# Patient Record
Sex: FEMALE | Race: WHITE
[De-identification: names, ages, dates, MRNs, and addresses within clinical notes are randomized per-mention and may not be internally consistent; named-entity substitution may affect disease eponyms.]

---

## 2017-04-22 ENCOUNTER — TRANSCRIPTION ENCOUNTER (OUTPATIENT)
Age: 57
End: 2017-04-22

## 2017-05-18 ENCOUNTER — RESULT REVIEW (OUTPATIENT)
Age: 57
End: 2017-05-18

## 2017-06-14 ENCOUNTER — RESULT REVIEW (OUTPATIENT)
Age: 57
End: 2017-06-14

## 2017-11-03 ENCOUNTER — EMERGENCY (EMERGENCY)
Facility: HOSPITAL | Age: 57
LOS: 0 days | Discharge: HOME | End: 2017-11-03
Attending: EMERGENCY MEDICINE | Admitting: EMERGENCY MEDICINE
Payer: COMMERCIAL

## 2017-11-03 VITALS
DIASTOLIC BLOOD PRESSURE: 71 MMHG | OXYGEN SATURATION: 97 % | RESPIRATION RATE: 18 BRPM | HEART RATE: 77 BPM | HEIGHT: 68 IN | SYSTOLIC BLOOD PRESSURE: 140 MMHG | TEMPERATURE: 98 F

## 2017-11-03 LAB
ALBUMIN SERPL ELPH-MCNC: 3.5 G/DL — SIGNIFICANT CHANGE UP (ref 3.3–5)
ALP SERPL-CCNC: 78 U/L — SIGNIFICANT CHANGE UP (ref 40–120)
ALT FLD-CCNC: 27 U/L — SIGNIFICANT CHANGE UP (ref 12–78)
ANION GAP SERPL CALC-SCNC: 9 MMOL/L — SIGNIFICANT CHANGE UP (ref 5–17)
APTT BLD: 34.7 SEC — SIGNIFICANT CHANGE UP (ref 27.5–37.4)
AST SERPL-CCNC: 24 U/L — SIGNIFICANT CHANGE UP (ref 15–37)
BASOPHILS # BLD AUTO: 0.1 K/UL — SIGNIFICANT CHANGE UP (ref 0–0.2)
BASOPHILS NFR BLD AUTO: 0.5 % — SIGNIFICANT CHANGE UP (ref 0–2)
BILIRUB SERPL-MCNC: 0.5 MG/DL — SIGNIFICANT CHANGE UP (ref 0.2–1.2)
BUN SERPL-MCNC: 18 MG/DL — SIGNIFICANT CHANGE UP (ref 7–23)
CALCIUM SERPL-MCNC: 9.3 MG/DL — SIGNIFICANT CHANGE UP (ref 8.5–10.1)
CHLORIDE SERPL-SCNC: 105 MMOL/L — SIGNIFICANT CHANGE UP (ref 96–108)
CO2 SERPL-SCNC: 27 MMOL/L — SIGNIFICANT CHANGE UP (ref 22–31)
CREAT SERPL-MCNC: 0.86 MG/DL — SIGNIFICANT CHANGE UP (ref 0.5–1.3)
EOSINOPHIL # BLD AUTO: 0.1 K/UL — SIGNIFICANT CHANGE UP (ref 0–0.5)
EOSINOPHIL NFR BLD AUTO: 0.6 % — SIGNIFICANT CHANGE UP (ref 0–6)
GLUCOSE SERPL-MCNC: 95 MG/DL — SIGNIFICANT CHANGE UP (ref 70–99)
HCT VFR BLD CALC: 42 % — SIGNIFICANT CHANGE UP (ref 34.5–45)
HGB BLD-MCNC: 13.9 G/DL — SIGNIFICANT CHANGE UP (ref 11.5–15.5)
INR BLD: 1.07 RATIO — SIGNIFICANT CHANGE UP (ref 0.88–1.16)
LYMPHOCYTES # BLD AUTO: 1.5 K/UL — SIGNIFICANT CHANGE UP (ref 1–3.3)
LYMPHOCYTES # BLD AUTO: 13 % — SIGNIFICANT CHANGE UP (ref 13–44)
MCHC RBC-ENTMCNC: 31 PG — SIGNIFICANT CHANGE UP (ref 27–34)
MCHC RBC-ENTMCNC: 33.2 GM/DL — SIGNIFICANT CHANGE UP (ref 32–36)
MCV RBC AUTO: 93.4 FL — SIGNIFICANT CHANGE UP (ref 80–100)
MONOCYTES # BLD AUTO: 0.6 K/UL — SIGNIFICANT CHANGE UP (ref 0–0.9)
MONOCYTES NFR BLD AUTO: 4.7 % — SIGNIFICANT CHANGE UP (ref 2–14)
NEUTROPHILS # BLD AUTO: 9.7 K/UL — HIGH (ref 1.8–7.4)
NEUTROPHILS NFR BLD AUTO: 81.3 % — HIGH (ref 43–77)
PLATELET # BLD AUTO: 289 K/UL — SIGNIFICANT CHANGE UP (ref 150–400)
POTASSIUM SERPL-MCNC: 4 MMOL/L — SIGNIFICANT CHANGE UP (ref 3.5–5.3)
POTASSIUM SERPL-SCNC: 4 MMOL/L — SIGNIFICANT CHANGE UP (ref 3.5–5.3)
PROT SERPL-MCNC: 7.4 GM/DL — SIGNIFICANT CHANGE UP (ref 6–8.3)
PROTHROM AB SERPL-ACNC: 11.6 SEC — SIGNIFICANT CHANGE UP (ref 9.8–12.7)
RBC # BLD: 4.5 M/UL — SIGNIFICANT CHANGE UP (ref 3.8–5.2)
RBC # FLD: 12.2 % — SIGNIFICANT CHANGE UP (ref 10.3–14.5)
SODIUM SERPL-SCNC: 141 MMOL/L — SIGNIFICANT CHANGE UP (ref 135–145)
TROPONIN I SERPL-MCNC: <0.015 NG/ML — SIGNIFICANT CHANGE UP (ref 0.01–0.04)
WBC # BLD: 11.9 K/UL — HIGH (ref 3.8–10.5)
WBC # FLD AUTO: 11.9 K/UL — HIGH (ref 3.8–10.5)

## 2017-11-03 PROCEDURE — 71250 CT THORAX DX C-: CPT | Mod: 26

## 2017-11-03 PROCEDURE — 93010 ELECTROCARDIOGRAM REPORT: CPT

## 2017-11-03 PROCEDURE — 73590 X-RAY EXAM OF LOWER LEG: CPT | Mod: 26,RT

## 2017-11-03 PROCEDURE — 99285 EMERGENCY DEPT VISIT HI MDM: CPT

## 2017-11-03 RX ORDER — SODIUM CHLORIDE 9 MG/ML
1000 INJECTION INTRAMUSCULAR; INTRAVENOUS; SUBCUTANEOUS ONCE
Qty: 0 | Refills: 0 | Status: COMPLETED | OUTPATIENT
Start: 2017-11-03 | End: 2017-11-03

## 2017-11-03 RX ORDER — KETOROLAC TROMETHAMINE 30 MG/ML
30 SYRINGE (ML) INJECTION ONCE
Qty: 0 | Refills: 0 | Status: DISCONTINUED | OUTPATIENT
Start: 2017-11-03 | End: 2017-11-03

## 2017-11-03 RX ADMIN — SODIUM CHLORIDE 1000 MILLILITER(S): 9 INJECTION INTRAMUSCULAR; INTRAVENOUS; SUBCUTANEOUS at 17:10

## 2017-11-03 RX ADMIN — Medication 30 MILLIGRAM(S): at 19:28

## 2017-11-03 NOTE — ED ADULT NURSE REASSESSMENT NOTE - NS ED NURSE REASSESS COMMENT FT1
patient discharged home. alert and oriented x 4, respirations even and unlabored, ambulatory with steady gait. denies dizziness or nausea. iv taken out. written and verbal discharge and followup instructions given to patient, patient verbalized back understanding. patient calling friend for ride home. ambulatory to bathroom to change into clothing, all belongings returned to patient. copies of all lab and radiologic reports given to patient. patient discharged home from ED at 19:30, will wait in waiting room for ride home.
all results of Xrays discussed with pt pending dc home

## 2017-11-03 NOTE — ED PROVIDER NOTE - MUSCULOSKELETAL, MLM
Spine appears normal, range of motion is not limited. Leg compartments are soft. Spine appears normal, range of motion is not limited. Leg compartments are soft. Full ROM of both wrists, no tenderness, no swelling.
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
## 2017-11-03 NOTE — ED PROVIDER NOTE - PROGRESS NOTE DETAILS
Pt feeling well, workup negative. Pt given copy of results. Told to f/u for thyroid and adrenal gland findings.
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
## 2017-11-07 DIAGNOSIS — Z04.1 ENCOUNTER FOR EXAMINATION AND OBSERVATION FOLLOWING TRANSPORT ACCIDENT: ICD-10-CM

## 2019-05-15 NOTE — ED ADULT NURSE NOTE - GASTROINTESTINAL WDL
Abdomen soft, nontender, nondistended, bowel sounds present in all 4 quadrants.
no recurring infections/no persistent infections

## 2019-07-08 NOTE — ED ADULT TRIAGE NOTE - NS ED NURSE AMBULANCES
[facility] [No Acute Distress] : no acute distress [Well Developed] : well developed [Well-Appearing] : well-appearing [Well Nourished] : well nourished [Normal Sclera/Conjunctiva] : normal sclera/conjunctiva [PERRL] : pupils equal round and reactive to light [EOMI] : extraocular movements intact [Normal Outer Ear/Nose] : the outer ears and nose were normal in appearance [Normal Oropharynx] : the oropharynx was normal [No Lymphadenopathy] : no lymphadenopathy [No JVD] : no jugular venous distention [Thyroid Normal, No Nodules] : the thyroid was normal and there were no nodules present [Supple] : supple [No Accessory Muscle Use] : no accessory muscle use [Clear to Auscultation] : lungs were clear to auscultation bilaterally [No Respiratory Distress] : no respiratory distress  [Normal Rate] : normal rate  [Normal S1, S2] : normal S1 and S2 [Regular Rhythm] : with a regular rhythm [No Carotid Bruits] : no carotid bruits [No Abdominal Bruit] : a ~M bruit was not heard ~T in the abdomen [No Varicosities] : no varicosities [Pedal Pulses Present] : the pedal pulses are present [No Edema] : there was no peripheral edema [No Palpable Aorta] : no palpable aorta [Soft] : abdomen soft [No Extremity Clubbing/Cyanosis] : no extremity clubbing/cyanosis [Non-distended] : non-distended [Non Tender] : non-tender [No Masses] : no abdominal mass palpated [No HSM] : no HSM [Normal Anterior Cervical Nodes] : no anterior cervical lymphadenopathy [Normal Bowel Sounds] : normal bowel sounds [Normal Posterior Cervical Nodes] : no posterior cervical lymphadenopathy [No CVA Tenderness] : no CVA  tenderness [No Spinal Tenderness] : no spinal tenderness [No Joint Swelling] : no joint swelling [Grossly Normal Strength/Tone] : grossly normal strength/tone [No Rash] : no rash [Coordination Grossly Intact] : coordination grossly intact [No Focal Deficits] : no focal deficits [Normal Gait] : normal gait [Deep Tendon Reflexes (DTR)] : deep tendon reflexes were 2+ and symmetric [Normal Affect] : the affect was normal [Normal Insight/Judgement] : insight and judgment were intact [de-identified] : mild chronic venous changes [de-identified] : 2/6 syst murmor

## 2019-07-29 NOTE — ED ADULT NURSE NOTE - DISCHARGE DATE/TIME
Writer was notified that pt is calling requesting completion of the disability forms. Per the pt, the due date is today and she has not received two weeks worth of pay. Discussed situation further with the clinic manager. Explained that a letter was written, signed, and faxed to the pt. At this time, writer is uncertain if the provider completed the forms. Agreed to reach out to the provider. In the meantime, the clinic manager will have the pt refax these to the clinic.    03-Nov-2017 19:36

## 2019-10-20 ENCOUNTER — RESULT REVIEW (OUTPATIENT)
Age: 59
End: 2019-10-20

## 2020-12-07 PROBLEM — I49.9 CARDIAC ARRHYTHMIA, UNSPECIFIED: Chronic | Status: ACTIVE | Noted: 2017-11-03

## 2020-12-10 ENCOUNTER — APPOINTMENT (OUTPATIENT)
Dept: OTOLARYNGOLOGY | Facility: CLINIC | Age: 60
End: 2020-12-10
Payer: COMMERCIAL

## 2020-12-10 VITALS
WEIGHT: 288 LBS | HEIGHT: 67 IN | SYSTOLIC BLOOD PRESSURE: 133 MMHG | DIASTOLIC BLOOD PRESSURE: 86 MMHG | BODY MASS INDEX: 45.2 KG/M2 | HEART RATE: 71 BPM | TEMPERATURE: 97.8 F

## 2020-12-10 DIAGNOSIS — H72.91 UNSPECIFIED PERFORATION OF TYMPANIC MEMBRANE, RIGHT EAR: ICD-10-CM

## 2020-12-10 DIAGNOSIS — Z86.79 PERSONAL HISTORY OF OTHER DISEASES OF THE CIRCULATORY SYSTEM: ICD-10-CM

## 2020-12-10 DIAGNOSIS — Z86.39 PERSONAL HISTORY OF OTHER ENDOCRINE, NUTRITIONAL AND METABOLIC DISEASE: ICD-10-CM

## 2020-12-10 DIAGNOSIS — H61.22 IMPACTED CERUMEN, LEFT EAR: ICD-10-CM

## 2020-12-10 DIAGNOSIS — H90.3 SENSORINEURAL HEARING LOSS, BILATERAL: ICD-10-CM

## 2020-12-10 DIAGNOSIS — H61.303 ACQUIRED STENOSIS OF EXTERNAL EAR CANAL, UNSPECIFIED, BILATERAL: ICD-10-CM

## 2020-12-10 DIAGNOSIS — E27.8 OTHER SPECIFIED DISORDERS OF ADRENAL GLAND: ICD-10-CM

## 2020-12-10 DIAGNOSIS — Z87.19 PERSONAL HISTORY OF OTHER DISEASES OF THE DIGESTIVE SYSTEM: ICD-10-CM

## 2020-12-10 PROCEDURE — 92557 COMPREHENSIVE HEARING TEST: CPT

## 2020-12-10 PROCEDURE — 92567 TYMPANOMETRY: CPT

## 2020-12-10 PROCEDURE — G0268 REMOVAL OF IMPACTED WAX MD: CPT

## 2020-12-10 PROCEDURE — 99204 OFFICE O/P NEW MOD 45 MIN: CPT | Mod: 25

## 2020-12-10 PROCEDURE — 99072 ADDL SUPL MATRL&STAF TM PHE: CPT

## 2020-12-10 RX ORDER — IRBESARTAN 75 MG/1
75 TABLET ORAL
Refills: 0 | Status: ACTIVE | COMMUNITY

## 2020-12-10 RX ORDER — BUPROPION HYDROCHLORIDE 75 MG/1
75 TABLET, FILM COATED ORAL
Refills: 0 | Status: ACTIVE | COMMUNITY

## 2020-12-10 RX ORDER — ROSUVASTATIN CALCIUM 5 MG/1
TABLET, FILM COATED ORAL
Refills: 0 | Status: ACTIVE | COMMUNITY

## 2020-12-10 NOTE — ASSESSMENT
[FreeTextEntry1] : Patient here with problems hearing . Cerumen removed from left ear;  Subtotal right tm perforation.    Recommended strict dry ear precautions in right ear.  Discussed tympanoplasty for right ear ; also discussed hearing aide mohsen

## 2020-12-10 NOTE — REVIEW OF SYSTEMS
[Sneezing] : sneezing [Ear Pain] : ear pain [Ear Itch] : ear itch [Hearing Loss] : hearing loss [Lightheadedness] : lightheadedness [Ear Drainage] : ear drainage [Ear Noises] : ear noises [Nasal Congestion] : nasal congestion [Problem Snoring] : problem snoring [Sinus Pain] : sinus pain [Sinus Pressure] : sinus pressure [Snoring With Pauses] : snoring with pauses [Palpitations] : palpitations [Anxiety] : anxiety [Depression] : depression [Easy Bruising] : a tendency for easy bruising [Negative] : Endocrine [FreeTextEntry1] : reflux  joint aches  muscle aches   sweating at night

## 2020-12-10 NOTE — PHYSICAL EXAM
[Midline] : trachea located in midline position [Normal] : no rashes [FreeTextEntry1] : overweight [de-identified] : right eac normal; left impacted cerumen - narrow ear canals [de-identified] : right subtotal perforation ;

## 2021-02-24 ENCOUNTER — TRANSCRIPTION ENCOUNTER (OUTPATIENT)
Age: 61
End: 2021-02-24

## 2021-02-24 ENCOUNTER — APPOINTMENT (OUTPATIENT)
Dept: ORTHOPEDIC SURGERY | Facility: CLINIC | Age: 61
End: 2021-02-24
Payer: COMMERCIAL

## 2021-02-24 VITALS
WEIGHT: 290 LBS | HEIGHT: 68 IN | TEMPERATURE: 97.9 F | DIASTOLIC BLOOD PRESSURE: 77 MMHG | BODY MASS INDEX: 43.95 KG/M2 | HEART RATE: 88 BPM | SYSTOLIC BLOOD PRESSURE: 113 MMHG

## 2021-02-24 DIAGNOSIS — Z87.39 PERSONAL HISTORY OF OTHER DISEASES OF THE MUSCULOSKELETAL SYSTEM AND CONNECTIVE TISSUE: ICD-10-CM

## 2021-02-24 DIAGNOSIS — Z78.9 OTHER SPECIFIED HEALTH STATUS: ICD-10-CM

## 2021-02-24 PROCEDURE — 99203 OFFICE O/P NEW LOW 30 MIN: CPT

## 2021-02-24 PROCEDURE — 73560 X-RAY EXAM OF KNEE 1 OR 2: CPT | Mod: LT,RT

## 2021-02-24 PROCEDURE — 99072 ADDL SUPL MATRL&STAF TM PHE: CPT

## 2021-03-02 NOTE — REVIEW OF SYSTEMS
[Joint Pain] : joint pain [Joint Stiffness] : joint stiffness [Joint Swelling] : joint swelling [Feeling Tired] : fatigue [Lower Ext Edema] : lower extremity edema [SOB on Exertion] : shortness of breath on exertion [Abdominal Pain] : abdominal pain [Anxiety] : anxiety [Depression] : depression [Muscle Weakness] : muscle weakness [Hot Flashes] : hot flashes [Easy Bruising] : a tendency for easy bruising [Negative] : Neurological

## 2021-03-03 NOTE — PHYSICAL EXAM
[de-identified] : Right knee:\par Knee: Range of Motion in Degrees	\par 	                  Claimant:	Normal:	\par Flexion Active	  135 	                135-degrees	\par Flexion Passive	  135	                135-degrees	\par Extension Active	  0-5	                0-5-degrees	\par Extension Passive	  0-5	                0-5-degrees	\par \par No weakness to flexion/extension. No evidence of instability in the AP plane or varus or valgus stress.  Negative  Lachman.  Negative pivot shift.  Negative anterior drawer test.  Negative posterior drawer test.  Negative Desirae.  Negative Apley grind.  No medial or lateral joint line tenderness.  Positive tenderness over the medial and lateral facet of the patella.  Positive patellofemoral crepitations.  No lateral tilting patella.  No patella apprehension.  Positive crepitation in the medial and lateral femoral condyle.  No proximal or distal swelling, edema or tenderness.  No gross motor or sensory deficits. Mild intra-articular swelling.  2+ DP and PT pulses. Mild varus deformity.  Skin is intact.  No rashes, scars or lesions.\par \par Left knee:\par Knee: Range of Motion in Degrees	\par 	                  Claimant:	Normal:	\par Flexion Active	  135 	                135-degrees	\par Flexion Passive	  135	                135-degrees	\par Extension Active	  0-5	                0-5-degrees	\par Extension Passive	  0-5	                0-5-degrees	\par \par No weakness to flexion/extension. No evidence of instability in the AP plane or varus or valgus stress.  Negative  Lachman.  Negative pivot shift.  Negative anterior drawer test.  Negative posterior drawer test.  Negative Desirae.  Negative Apley grind.  No medial or lateral joint line tenderness.  Positive tenderness over the medial and lateral facet of the patella.  Positive patellofemoral crepitations.  No lateral tilting patella.  No patella apprehension.  Positive crepitation in the medial and lateral femoral condyle.  No proximal or distal swelling, edema or tenderness.  No gross motor or sensory deficits. Mild intra-articular swelling.  2+ DP and PT pulses. Mild varus deformity.  Skin is intact.  No rashes, scars or lesions.  [de-identified] : Gait: Slightly antalgic to antalgic gait.  Station: Normal  [de-identified] : Appearance: Well-developed, well-nourished female  in no acute distress.  [de-identified] : Radiographs, one to two views of the right knee, one to two views of the left knee and one view of bilateral knees, show moderate degenerative change.

## 2021-03-03 NOTE — DISCUSSION/SUMMARY
[de-identified] : The patient presents with osteoarthritis, bilateral knees.  At this time I recommend she undergo a course of physical therapy and anti-inflammatory.  She will be reassessed in four to six weeks.

## 2021-03-03 NOTE — CONSULT LETTER
[Dear  ___] : Dear  [unfilled], [Consult Letter:] : I had the pleasure of evaluating your patient, [unfilled]. [Please see my note below.] : Please see my note below. [Consult Closing:] : Thank you very much for allowing me to participate in the care of this patient.  If you have any questions, please do not hesitate to contact me. [Sincerely,] : Sincerely, [FreeTextEntry3] : Kaushik Ivy III, MD \par VICK/mago

## 2021-03-03 NOTE — HISTORY OF PRESENT ILLNESS
[9] : a current pain level of 9/10 [de-identified] : Kellie comes in today with complaints to bilateral knees.  She states it has been going on for the last several years, getting a little worse recently. The patient states the pain is intermittent.  The patient describes the pain as sharp.  The patient states rest make the symptoms better while walking and bending makes the symptoms worse.

## 2021-03-28 ENCOUNTER — APPOINTMENT (OUTPATIENT)
Dept: DISASTER EMERGENCY | Facility: OTHER | Age: 61
End: 2021-03-28
Payer: COMMERCIAL

## 2021-03-28 PROCEDURE — 0011A: CPT

## 2021-04-03 ENCOUNTER — TRANSCRIPTION ENCOUNTER (OUTPATIENT)
Age: 61
End: 2021-04-03

## 2021-04-25 ENCOUNTER — APPOINTMENT (OUTPATIENT)
Dept: DISASTER EMERGENCY | Facility: OTHER | Age: 61
End: 2021-04-25
Payer: COMMERCIAL

## 2021-04-25 PROCEDURE — 0012A: CPT

## 2021-07-08 ENCOUNTER — APPOINTMENT (OUTPATIENT)
Dept: ORTHOPEDIC SURGERY | Facility: CLINIC | Age: 61
End: 2021-07-08
Payer: COMMERCIAL

## 2021-07-08 VITALS
BODY MASS INDEX: 43.95 KG/M2 | HEART RATE: 80 BPM | SYSTOLIC BLOOD PRESSURE: 112 MMHG | HEIGHT: 68 IN | DIASTOLIC BLOOD PRESSURE: 77 MMHG | WEIGHT: 290 LBS

## 2021-07-08 PROCEDURE — 73560 X-RAY EXAM OF KNEE 1 OR 2: CPT | Mod: LT,RT

## 2021-07-08 PROCEDURE — 99214 OFFICE O/P EST MOD 30 MIN: CPT | Mod: 25

## 2021-07-08 PROCEDURE — 20610 DRAIN/INJ JOINT/BURSA W/O US: CPT | Mod: RT

## 2021-07-15 NOTE — ADDENDUM
[FreeTextEntry1] : This note was written by Jyotsna Gilbert on 07/13/2021 acting as scribe for Kaushik Ivy III, MD

## 2021-07-15 NOTE — PROCEDURE
[de-identified] : Consent: \par At this time, I have recommended an injection to the right knee.  The risks and benefits of the procedure were discussed with the patient in detail.  Upon verbal consent of the patient, we proceeded with the injection as noted below.  \par \par Procedure:  \par After a sterile prep, the patient underwent an injection of 9 cc of 1% Lidocaine without epinephrine and 1 cc of Kenalog into the right knee.  The patient tolerated the procedure well.  There were no complications.

## 2021-07-15 NOTE — HISTORY OF PRESENT ILLNESS
[de-identified] : Kellie comes in today.  She is having increasing complaints of pain to bilateral knees, right worse than left.

## 2021-07-15 NOTE — PHYSICAL EXAM
[de-identified] : Right knee:\par Knee: Range of Motion in Degrees	\par 	                  Claimant:	Normal:	\par Flexion Active	  135 	                135-degrees	\par Flexion Passive	  135	                135-degrees	\par Extension Active	  0-5	                0-5-degrees	\par Extension Passive	  0-5	                0-5-degrees	\par \par No weakness to flexion/extension. No evidence of instability in the AP plane or varus or valgus stress.  Negative  Lachman.  Negative pivot shift.  Negative anterior drawer test.  Negative posterior drawer test.  Negative Desirae.  Negative Apley grind.  No medial or lateral joint line tenderness.  Positive tenderness over the medial and lateral facet of the patella.  Positive patellofemoral crepitations.  No lateral tilting patella.  No patella apprehension.  Positive crepitation in the medial and lateral femoral condyle.  No proximal or distal swelling, edema or tenderness.  No gross motor or sensory deficits. Mild intra-articular swelling.  2+ DP and PT pulses. Mild varus deformity.  Skin is intact.  No rashes, scars or lesions.\par \par Left knee:\par Knee: Range of Motion in Degrees	\par 	                  Claimant:	Normal:	\par Flexion Active	  135 	                135-degrees	\par Flexion Passive	  135	                135-degrees	\par Extension Active	  0-5	                0-5-degrees	\par Extension Passive	  0-5	                0-5-degrees	\par \par No weakness to flexion/extension. No evidence of instability in the AP plane or varus or valgus stress.  Negative  Lachman.  Negative pivot shift.  Negative anterior drawer test.  Negative posterior drawer test.  Negative Desirae.  Negative Apley grind.  No medial or lateral joint line tenderness.  Positive tenderness over the medial and lateral facet of the patella.  Positive patellofemoral crepitations.  No lateral tilting patella.  No patella apprehension.  Positive crepitation in the medial and lateral femoral condyle.  No proximal or distal swelling, edema or tenderness.  No gross motor or sensory deficits. Mild intra-articular swelling.  2+ DP and PT pulses. Mild varus deformity.  Skin is intact.  No rashes, scars or lesions.  [de-identified] : Gait: Slightly antalgic to antalgic gait.  Station: Normal  [de-identified] : Appearance: Well-developed, well-nourished female  in no acute distress.  [de-identified] : Radiographs, one to two views of the right knee, one to two views of the left knee and one view of bilateral knees, show severe arthritis.

## 2021-07-15 NOTE — DISCUSSION/SUMMARY
[de-identified] : The patient presents with osteoarthritis, bilateral knees.  At this time I recommend ice, elevation and anti-inflammatory.  She will be reassessed in three weeks.

## 2021-07-19 ENCOUNTER — RESULT REVIEW (OUTPATIENT)
Age: 61
End: 2021-07-19

## 2021-07-26 ENCOUNTER — APPOINTMENT (OUTPATIENT)
Dept: ORTHOPEDIC SURGERY | Facility: CLINIC | Age: 61
End: 2021-07-26
Payer: COMMERCIAL

## 2021-07-26 VITALS
SYSTOLIC BLOOD PRESSURE: 123 MMHG | DIASTOLIC BLOOD PRESSURE: 84 MMHG | BODY MASS INDEX: 42.89 KG/M2 | WEIGHT: 283 LBS | HEIGHT: 68 IN | HEART RATE: 78 BPM

## 2021-07-26 DIAGNOSIS — M17.12 UNILATERAL PRIMARY OSTEOARTHRITIS, LEFT KNEE: ICD-10-CM

## 2021-07-26 PROCEDURE — 20610 DRAIN/INJ JOINT/BURSA W/O US: CPT | Mod: LT

## 2021-07-28 NOTE — PROCEDURE
[de-identified] : Consent: \par At this time, I have recommended an injection to the left knee.  The risks and benefits of the procedure were discussed with the patient in detail.  Upon verbal consent of the patient, we proceeded with the injection as noted below.  \par \par Procedure:  \par After a sterile prep, the patient underwent an injection of 9 cc of 1% Lidocaine without epinephrine and 1 cc of Kenalog into the left knee.  The patient tolerated the procedure well.  There were no complications.

## 2021-07-28 NOTE — HISTORY OF PRESENT ILLNESS
[de-identified] : Kellie comes in today for her bilateral knees.  She states her right is feeling better, but she is still having complaints of pain to the left.

## 2021-07-28 NOTE — DISCUSSION/SUMMARY
[de-identified] : The patient presents with osteoarthritis, left knee.  At this time I recommend ice and elevation.  She will be reassessed in three or four weeks.

## 2021-07-28 NOTE — PHYSICAL EXAM
[de-identified] : Left knee:\par Knee: Range of Motion in Degrees	\par 	                  Claimant:	Normal:	\par Flexion Active	  135 	                135-degrees	\par Flexion Passive	  135	                135-degrees	\par Extension Active	  0-5	                0-5-degrees	\par Extension Passive	  0-5	                0-5-degrees	\par \par No weakness to flexion/extension. No evidence of instability in the AP plane or varus or valgus stress.  Negative  Lachman.  Negative pivot shift.  Negative anterior drawer test.  Negative posterior drawer test.  Negative Desirae.  Negative Apley grind.  No medial or lateral joint line tenderness.  Positive tenderness over the medial and lateral facet of the patella.  Positive patellofemoral crepitations.  No lateral tilting patella.  No patella apprehension.  Positive crepitation in the medial and lateral femoral condyle.  No proximal or distal swelling, edema or tenderness.  No gross motor or sensory deficits. Mild intra-articular swelling.  2+ DP and PT pulses. Mild varus deformity.  Skin is intact.  No rashes, scars or lesions.  [de-identified] : Gait: Slightly antalgic to antalgic gait.  Station: Normal  [de-identified] : Appearance: Well-developed, well-nourished female  in no acute distress.

## 2021-07-28 NOTE — ADDENDUM
[FreeTextEntry1] : This note was written by Jyotsna Gilbert on 07/27/2021 acting as scribe for Kaushik Ivy III, MD

## 2021-09-27 ENCOUNTER — APPOINTMENT (OUTPATIENT)
Dept: ORTHOPEDIC SURGERY | Facility: CLINIC | Age: 61
End: 2021-09-27
Payer: COMMERCIAL

## 2021-09-27 VITALS
SYSTOLIC BLOOD PRESSURE: 134 MMHG | HEIGHT: 68 IN | WEIGHT: 284 LBS | BODY MASS INDEX: 43.04 KG/M2 | DIASTOLIC BLOOD PRESSURE: 85 MMHG | HEART RATE: 91 BPM

## 2021-09-27 DIAGNOSIS — M17.11 UNILATERAL PRIMARY OSTEOARTHRITIS, RIGHT KNEE: ICD-10-CM

## 2021-09-27 PROCEDURE — 20610 DRAIN/INJ JOINT/BURSA W/O US: CPT | Mod: RT

## 2021-09-30 NOTE — ADDENDUM
[FreeTextEntry1] : This note was written by Rachael Saini on 09/30/2021 acting as scribe for Kaushik Ivy III, MD

## 2021-09-30 NOTE — HISTORY OF PRESENT ILLNESS
[de-identified] : The patient comes in today with increasing complaints of pain to her right knee.

## 2021-09-30 NOTE — DISCUSSION/SUMMARY
[de-identified] : At this time, due to osteoarthritis of the right knee, the patient was given a cortisone injection.  I recommend ice, elevation and reassessment in 4-6 weeks.

## 2021-09-30 NOTE — PHYSICAL EXAM
[de-identified] : Right knee:\par Knee: Range of Motion in Degrees	\par 	  Claimant:	Normal:	\par Flexion Active	 135 	 135-degrees	\par Flexion Passive	 135	 135-degrees	\par Extension Active	 0-5	 0-5-degrees	\par Extension Passive	 0-5	 0-5-degrees	\par \par No weakness to flexion/extension. No evidence of instability in the AP plane or varus or valgus stress. Negative Lachman. Negative pivot shift. Negative anterior drawer test. Negative posterior drawer test. Negative Desirae. Negative Apley grind. No medial or lateral joint line tenderness. Positive tenderness over the medial and lateral facet of the patella. Positive patellofemoral crepitations. No lateral tilting patella. No patella apprehension. Positive crepitation in the medial and lateral femoral condyle. No proximal or distal swelling, edema or tenderness. No gross motor or sensory deficits. Mild intra-articular swelling. 2+ DP and PT pulses. Mild varus deformity. Skin is intact. No rashes, scars or lesions.\par  [de-identified] : Gait and Station:  Ambulating with a slightly antalgic to antalgic gait.  Station:  Normal.  [de-identified] : Appearance:  Well-developed, well-nourished female in no acute distress.\par

## 2021-09-30 NOTE — PROCEDURE
[de-identified] : Consent: \par At this time, I have recommended an injection to the right knee.  The risks and benefits of the procedure were discussed with the patient in detail.  Upon verbal consent of the patient, we proceeded with the injection as noted below.  \par \par Procedure:  \par After a sterile prep, the patient underwent an injection of 9 cc of 1% Lidocaine without epinephrine and 1 cc of Kenalog into the right knee.  The patient tolerated the procedure well.  There were no complications.  \par \par

## 2021-11-15 ENCOUNTER — FORM ENCOUNTER (OUTPATIENT)
Age: 61
End: 2021-11-15

## 2021-11-15 ENCOUNTER — APPOINTMENT (OUTPATIENT)
Dept: ORTHOPEDIC SURGERY | Facility: CLINIC | Age: 61
End: 2021-11-15
Payer: COMMERCIAL

## 2021-11-15 VITALS
WEIGHT: 284 LBS | DIASTOLIC BLOOD PRESSURE: 80 MMHG | SYSTOLIC BLOOD PRESSURE: 117 MMHG | HEART RATE: 73 BPM | HEIGHT: 68 IN | BODY MASS INDEX: 43.04 KG/M2

## 2021-11-15 DIAGNOSIS — M17.0 BILATERAL PRIMARY OSTEOARTHRITIS OF KNEE: ICD-10-CM

## 2021-11-15 PROCEDURE — 99213 OFFICE O/P EST LOW 20 MIN: CPT

## 2021-11-15 RX ORDER — SODIUM HYALURONATE INTRA-ARTICULAR SOLN PREF SYR 25 MG/2.5ML 25/2.5 MG/ML
25 SOLUTION PREFILLED SYRINGE INTRAARTICULAR
Qty: 10 | Refills: 0 | Status: ACTIVE | COMMUNITY
Start: 2021-11-15 | End: 1900-01-01

## 2021-11-16 NOTE — HISTORY OF PRESENT ILLNESS
[de-identified] : The patient comes in today for her bilateral knees.  She had some relief from her cortisone injection.

## 2021-11-16 NOTE — ADDENDUM
[FreeTextEntry1] : This note was written by Jaclyn Moore on 11/16/2021 acting as a scribe for SIMA MONTEZ III, MD

## 2021-11-16 NOTE — PHYSICAL EXAM
[de-identified] : Right Knee:\par Range of Motion in Degrees	\par 	 Claimant:	Normal:	\par Flexion Active	 135 	 135-degrees	\par Flexion Passive	 135	 135-degrees	\par Extension Active	 0-5	 0-5-degrees	\par Extension Passive	 0-5	 0-5-degrees	\par \par No weakness to flexion/extension. No evidence of instability in the AP plane or varus or valgus stress. Negative Lachman. Negative pivot shift. Negative anterior drawer test. Negative posterior drawer test. Negative Desirae. Negative Apley grind. No medial or lateral joint line tenderness. Positive tenderness over the medial and lateral facet of the patella. Positive patellofemoral crepitations. No lateral tilting patella. No patella apprehension. Positive crepitation in the medial and lateral femoral condyle. No proximal or distal swelling, edema or tenderness. No gross motor or sensory deficits. Mild intra-articular swelling. 2+ DP and PT pulses. Mild varus deformity. Skin is intact. No rashes, scars or lesions.\par  [de-identified] : Ambulating with a slightly antalgic to antalgic gait.  Station:  Normal.  [de-identified] : Appearance:  Well-developed, well-nourished female in no acute distress.\par

## 2021-11-16 NOTE — DISCUSSION/SUMMARY
[de-identified] : At this time, due to osteoarthritis of the bilateral knees and since she has had injections and physical therapy and the pain has persisted, I recommend she undergo a course of viscosupplementation. Of note, we had a long discussion concerning weight loss should she ultimately require knee arthroplasties.

## 2021-11-18 ENCOUNTER — APPOINTMENT (OUTPATIENT)
Dept: ORTHOPEDIC SURGERY | Facility: CLINIC | Age: 61
End: 2021-11-18
Payer: COMMERCIAL

## 2021-11-18 VITALS
HEART RATE: 81 BPM | HEIGHT: 68 IN | DIASTOLIC BLOOD PRESSURE: 78 MMHG | WEIGHT: 284 LBS | BODY MASS INDEX: 43.04 KG/M2 | SYSTOLIC BLOOD PRESSURE: 119 MMHG

## 2021-11-18 PROCEDURE — 73030 X-RAY EXAM OF SHOULDER: CPT | Mod: RT

## 2021-11-18 PROCEDURE — 99213 OFFICE O/P EST LOW 20 MIN: CPT

## 2021-11-22 RX ORDER — MELOXICAM 15 MG/1
15 TABLET ORAL
Qty: 14 | Refills: 0 | Status: ACTIVE | COMMUNITY
Start: 2021-11-22 | End: 1900-01-01

## 2021-11-29 NOTE — PHYSICAL EXAM
[de-identified] : Right Shoulder: \par Range of Motion in Degrees:   	\par    	                        Claimant:          Normal:  	\par Abduction (Active)  	180  	180 degrees  	\par Abduction (Passive)  	180  	180 degrees  	\par Forward elevation (Active):  	180  	180 degrees  	\par Forward elevation (Passive):  180  	180 degrees  	\par External rotation (Active):  	45  	45 degrees  	\par External rotation (Passive):  	45  	45 degrees  	\par Internal rotation (Active):  	L-1  	L-1  	\par Internal rotation (Passive):  	L-1  	L-1  	\par \par Diffusely tender. No motor weakness to internal rotation, external rotation or abduction in the scapular plane.  Negative crank test.  Negative O’Bruce’s test.  Negative Speed’s test. Negative Yergason’s test.  Negative cross arm test. Negative Hawkin’s sign.  Negative Neer’s sign.  Negative apprehension. Negative sulcus sign.  No gross neurological or vascular deficits distally.  Skin is intact.  No rashes, scars or lesions. 2+ radial and ulnar pulses. \par \par Left Shoulder: \par Range of Motion in Degrees:   	\par    	                        Claimant:  	Normal:  	\par Abduction (Active)  	180  	180 degrees  	\par Abduction (Passive)  	180  	180 degrees  	\par Forward elevation (Active):  	180  	180 degrees  	\par Forward elevation (Passive):  180  	180 degrees  	\par External rotation (Active):  	45  	45 degrees  	\par External rotation (Passive):  	45  	45 degrees  	\par Internal rotation (Active):  	L-1  	L-1  	\par Internal rotation (Passive):  	L-1  	L-1  \par 	\par No motor weakness to internal rotation, external rotation or abduction in the scapular plane.  Negative crank test.  Negative O’Bruce’s test.  Negative Speed’s test. Negative Yergason’s test.  Negative cross arm test.  No tenderness to palpation at the AC joint. Negative Hawkin’s sign.  Negative Neer’s sign.  Negative apprehension. Negative sulcus sign.  No gross neurological or vascular deficits distally.  Skin is intact.  No rashes, scars or lesions. 2+ radial and ulnar pulses. No extra-articular swelling or tenderness.\par   [de-identified] : Ambulating with a normal gait.  Station:  Normal.  [de-identified] : Appearance:  Well-developed, well-nourished female in no acute distress.\par   [de-identified] : Radiographs, two views of the right shoulder, show mild degenerative changes.

## 2021-11-29 NOTE — ADDENDUM
[FreeTextEntry1] : This note was written by Jaclyn Moore on 11/29/2021 acting as a scribe for SIMA MONTEZ III, MD

## 2021-11-29 NOTE — DISCUSSION/SUMMARY
[de-identified] : At this time, due to contusion of the right shoulder, she is instructed on home therapeutic modalities.  She will be reassessed in four to six weeks.

## 2021-11-29 NOTE — HISTORY OF PRESENT ILLNESS
[de-identified] : The patient comes in today for her right shoulder stating that she had a fall on November 17, 2021.  She complains of pain in her right shoulder.

## 2021-12-10 DIAGNOSIS — S40.011A CONTUSION OF RIGHT SHOULDER, INITIAL ENCOUNTER: ICD-10-CM

## 2021-12-10 RX ORDER — MELOXICAM 7.5 MG/1
7.5 TABLET ORAL
Qty: 30 | Refills: 0 | Status: ACTIVE | COMMUNITY
Start: 2021-12-10 | End: 1900-01-01

## 2021-12-15 ENCOUNTER — TRANSCRIPTION ENCOUNTER (OUTPATIENT)
Age: 61
End: 2021-12-15

## 2021-12-23 ENCOUNTER — APPOINTMENT (OUTPATIENT)
Dept: ORTHOPEDIC SURGERY | Facility: CLINIC | Age: 61
End: 2021-12-23

## 2022-12-06 NOTE — ADDENDUM
[FreeTextEntry1] : This note was written by Jyotsna Gilbert on 03/02/2021 acting as scribe for Kaushik Ivy III, MD  Normal

## 2024-03-13 ENCOUNTER — EMERGENCY (EMERGENCY)
Facility: HOSPITAL | Age: 64
LOS: 0 days | Discharge: HOME | End: 2024-03-13
Attending: EMERGENCY MEDICINE
Payer: COMMERCIAL

## 2024-03-13 VITALS
OXYGEN SATURATION: 98 % | TEMPERATURE: 98 F | DIASTOLIC BLOOD PRESSURE: 74 MMHG | SYSTOLIC BLOOD PRESSURE: 142 MMHG | WEIGHT: 179.9 LBS | RESPIRATION RATE: 18 BRPM | HEART RATE: 74 BPM

## 2024-03-13 DIAGNOSIS — Z91.148 PATIENT'S OTHER NONCOMPLIANCE WITH MEDICATION REGIMEN FOR OTHER REASON: ICD-10-CM

## 2024-03-13 DIAGNOSIS — T43.291A POISONING BY OTHER ANTIDEPRESSANTS, ACCIDENTAL (UNINTENTIONAL), INITIAL ENCOUNTER: ICD-10-CM

## 2024-03-13 DIAGNOSIS — Z79.82 LONG TERM (CURRENT) USE OF ASPIRIN: ICD-10-CM

## 2024-03-13 DIAGNOSIS — Z88.0 ALLERGY STATUS TO PENICILLIN: ICD-10-CM

## 2024-03-13 DIAGNOSIS — R68.2 DRY MOUTH, UNSPECIFIED: ICD-10-CM

## 2024-03-13 DIAGNOSIS — T39.016A UNDERDOSING OF ASPIRIN, INITIAL ENCOUNTER: ICD-10-CM

## 2024-03-13 DIAGNOSIS — F32.A DEPRESSION, UNSPECIFIED: ICD-10-CM

## 2024-03-13 PROCEDURE — 99283 EMERGENCY DEPT VISIT LOW MDM: CPT

## 2024-03-13 PROCEDURE — 99282 EMERGENCY DEPT VISIT SF MDM: CPT

## 2024-03-13 NOTE — ED ADULT TRIAGE NOTE - CHIEF COMPLAINT QUOTE
pt BIBEMS c/o chewing Wellbutrin. pt reports she was taking her nighttime medication, accidentally chewing 100mg Wellbutrin. pt reports she started feeling tingling in her mouth, and called EMS. pt denies any other complaints. pmh HTN, HLD, depression, anxiety. allergies to penicillin and amoxicillin.

## 2024-03-13 NOTE — ED STATDOCS - OBJECTIVE STATEMENT
62 y/o female with PMHx of depression, Unga presents to the ED after she accidently chewed her 100mg Wellbutrin this evening and read online that it could be dangerous so she came to the ED. Pt remotes she had some dry mouth. Pt reports she chews her baby aspirin, got distracted and chewed her Wellbutrin too. Pill is not extended release.

## 2024-03-13 NOTE — ED STATDOCS - NSFOLLOWUPINSTRUCTIONS_ED_ALL_ED_FT
FOLLOW UP WITH YOUR DOCTOR AS NEEDED. CALL THE OFFICE TO MAKE AN APPOINTMENT. RETURN TO ER FOR ANY WORSENING SYMPTOMS OR NEW CONCERNS.

## 2024-03-13 NOTE — ED STATDOCS - PATIENT PORTAL LINK FT
You can access the FollowMyHealth Patient Portal offered by [facility] by registering at the following website: [email or web address]. By joining [employer]’s FollowMyHealth portal, you will also be able to view your health information using other applications (apps) compatible with our system.

## 2024-03-13 NOTE — ED STATDOCS - CLINICAL SUMMARY MEDICAL DECISION MAKING FREE TEXT BOX
signed Marina Zimmerman PA-C Pt seen initially in intake by Dr Barrera.   63F came to ER for concern related to accidentally chewing her wellbutrin 100mg pill at 7pm tonight. Pt notes she forgot to take her am dose today and tonight she had chewed her baby aspirin and then put the wellbutrin in her mouth and started chewing it as well before she realized her mistake. She read the warnings which mentioned seizures and got very worried and came to ER. pt has pill bottle with her, it is not extended release. pt asymptomatic at this time. will DC. return precautions given. Pt feeling well, pt and family agree with DC and plan of care.

## 2025-01-24 ENCOUNTER — APPOINTMENT (OUTPATIENT)
Age: 65
End: 2025-01-24
Payer: COMMERCIAL

## 2025-01-24 VITALS
WEIGHT: 293 LBS | HEART RATE: 74 BPM | BODY MASS INDEX: 44.41 KG/M2 | DIASTOLIC BLOOD PRESSURE: 88 MMHG | RESPIRATION RATE: 16 BRPM | HEIGHT: 68 IN | SYSTOLIC BLOOD PRESSURE: 151 MMHG | TEMPERATURE: 97.2 F | OXYGEN SATURATION: 97 %

## 2025-01-24 DIAGNOSIS — E66.01 MORBID (SEVERE) OBESITY DUE TO EXCESS CALORIES: ICD-10-CM

## 2025-01-24 DIAGNOSIS — M54.50 LOW BACK PAIN, UNSPECIFIED: ICD-10-CM

## 2025-01-24 DIAGNOSIS — Z80.42 FAMILY HISTORY OF MALIGNANT NEOPLASM OF PROSTATE: ICD-10-CM

## 2025-01-24 DIAGNOSIS — E78.5 HYPERLIPIDEMIA, UNSPECIFIED: ICD-10-CM

## 2025-01-24 DIAGNOSIS — G47.33 OBSTRUCTIVE SLEEP APNEA (ADULT) (PEDIATRIC): ICD-10-CM

## 2025-01-24 PROCEDURE — 99386 PREV VISIT NEW AGE 40-64: CPT

## 2025-01-24 RX ORDER — TRAMADOL HYDROCHLORIDE 50 MG/1
50 TABLET, COATED ORAL
Refills: 0 | Status: ACTIVE | COMMUNITY

## 2025-01-24 RX ORDER — TIRZEPATIDE 5 MG/.5ML
5 INJECTION, SOLUTION SUBCUTANEOUS
Qty: 3 | Refills: 1 | Status: ACTIVE | COMMUNITY
Start: 2025-01-24 | End: 1900-01-01

## 2025-01-24 RX ORDER — BUPROPION HYDROCHLORIDE 100 MG/1
100 TABLET, FILM COATED ORAL
Refills: 0 | Status: ACTIVE | COMMUNITY

## 2025-01-24 RX ORDER — LOSARTAN POTASSIUM 50 MG/1
50 TABLET, FILM COATED ORAL
Refills: 0 | Status: ACTIVE | COMMUNITY

## 2025-01-24 RX ORDER — FUROSEMIDE 20 MG/1
20 TABLET ORAL
Refills: 0 | Status: ACTIVE | COMMUNITY

## 2025-01-24 RX ORDER — OXYBUTYNIN CHLORIDE 5 MG/1
5 TABLET ORAL
Refills: 0 | Status: ACTIVE | COMMUNITY

## 2025-01-24 RX ORDER — POTASSIUM CHLORIDE 10 MEQ
10 CAPSULE, EXTENDED RELEASE ORAL
Refills: 0 | Status: ACTIVE | COMMUNITY

## 2025-01-24 RX ORDER — OMEPRAZOLE 20 MG/1
20 CAPSULE, DELAYED RELEASE ORAL
Refills: 0 | Status: ACTIVE | COMMUNITY

## 2025-01-24 RX ORDER — ROSUVASTATIN CALCIUM 20 MG/1
20 TABLET, FILM COATED ORAL
Refills: 0 | Status: ACTIVE | COMMUNITY

## 2025-03-03 RX ORDER — OMEPRAZOLE 20 MG/1
20 CAPSULE, DELAYED RELEASE ORAL DAILY
Qty: 30 | Refills: 3 | Status: ACTIVE | COMMUNITY
Start: 2025-03-03 | End: 1900-01-01

## 2025-03-03 RX ORDER — BUPROPION HYDROCHLORIDE 100 MG/1
100 TABLET, FILM COATED ORAL DAILY
Qty: 90 | Refills: 0 | Status: ACTIVE | COMMUNITY
Start: 2025-03-03 | End: 1900-01-01

## 2025-03-03 RX ORDER — POTASSIUM CHLORIDE 750 MG/1
10 TABLET, FILM COATED, EXTENDED RELEASE ORAL DAILY
Qty: 90 | Refills: 0 | Status: ACTIVE | COMMUNITY
Start: 2025-03-03 | End: 1900-01-01

## 2025-03-03 RX ORDER — TRAMADOL HYDROCHLORIDE 50 MG/1
50 TABLET, COATED ORAL
Qty: 30 | Refills: 0 | Status: ACTIVE | COMMUNITY
Start: 2025-03-03 | End: 1900-01-01

## 2025-03-03 RX ORDER — ROSUVASTATIN CALCIUM 20 MG/1
20 TABLET, FILM COATED ORAL
Qty: 90 | Refills: 2 | Status: ACTIVE | COMMUNITY
Start: 2025-03-03 | End: 1900-01-01

## 2025-03-04 ENCOUNTER — NON-APPOINTMENT (OUTPATIENT)
Age: 65
End: 2025-03-04

## 2025-03-06 ENCOUNTER — TRANSCRIPTION ENCOUNTER (OUTPATIENT)
Age: 65
End: 2025-03-06

## 2025-03-10 ENCOUNTER — TRANSCRIPTION ENCOUNTER (OUTPATIENT)
Age: 65
End: 2025-03-10

## 2025-03-10 RX ORDER — POTASSIUM CHLORIDE 750 MG/1
10 CAPSULE, EXTENDED RELEASE ORAL DAILY
Qty: 90 | Refills: 0 | Status: ACTIVE | COMMUNITY
Start: 2025-03-10 | End: 1900-01-01

## 2025-04-10 ENCOUNTER — TRANSCRIPTION ENCOUNTER (OUTPATIENT)
Age: 65
End: 2025-04-10

## 2025-04-29 ENCOUNTER — NON-APPOINTMENT (OUTPATIENT)
Age: 65
End: 2025-04-29

## 2025-04-29 RX ORDER — METHYLPREDNISOLONE ACETATE 80 MG/ML
0 INJECTION, SUSPENSION INTRA-ARTICULAR; INTRALESIONAL; INTRAMUSCULAR; SOFT TISSUE
Refills: 0 | DISCHARGE
Start: 2025-04-29 | End: 2025-05-04

## 2025-05-01 ENCOUNTER — INPATIENT (INPATIENT)
Facility: HOSPITAL | Age: 65
LOS: 4 days | Discharge: HOME | DRG: 551 | End: 2025-05-06
Attending: HOSPITALIST | Admitting: STUDENT IN AN ORGANIZED HEALTH CARE EDUCATION/TRAINING PROGRAM
Payer: COMMERCIAL

## 2025-05-01 VITALS — HEIGHT: 67 IN

## 2025-05-01 DIAGNOSIS — Z78.9 OTHER SPECIFIED HEALTH STATUS: Chronic | ICD-10-CM

## 2025-05-01 DIAGNOSIS — I49.9 CARDIAC ARRHYTHMIA, UNSPECIFIED: ICD-10-CM

## 2025-05-01 DIAGNOSIS — S32.009A UNSPECIFIED FRACTURE OF UNSPECIFIED LUMBAR VERTEBRA, INITIAL ENCOUNTER FOR CLOSED FRACTURE: ICD-10-CM

## 2025-05-01 LAB
ALBUMIN SERPL ELPH-MCNC: 3.3 G/DL — SIGNIFICANT CHANGE UP (ref 3.3–5)
ALP SERPL-CCNC: 72 U/L — SIGNIFICANT CHANGE UP (ref 40–120)
ALT FLD-CCNC: 25 U/L — SIGNIFICANT CHANGE UP (ref 12–78)
ANION GAP SERPL CALC-SCNC: 6 MMOL/L — SIGNIFICANT CHANGE UP (ref 5–17)
APTT BLD: 27.5 SEC — SIGNIFICANT CHANGE UP (ref 26.1–36.8)
AST SERPL-CCNC: 18 U/L — SIGNIFICANT CHANGE UP (ref 15–37)
BASOPHILS # BLD AUTO: 0.01 K/UL — SIGNIFICANT CHANGE UP (ref 0–0.2)
BASOPHILS NFR BLD AUTO: 0.1 % — SIGNIFICANT CHANGE UP (ref 0–2)
BILIRUB SERPL-MCNC: 0.6 MG/DL — SIGNIFICANT CHANGE UP (ref 0.2–1.2)
BUN SERPL-MCNC: 15 MG/DL — SIGNIFICANT CHANGE UP (ref 7–23)
CALCIUM SERPL-MCNC: 9.6 MG/DL — SIGNIFICANT CHANGE UP (ref 8.5–10.1)
CHLORIDE SERPL-SCNC: 108 MMOL/L — SIGNIFICANT CHANGE UP (ref 96–108)
CO2 SERPL-SCNC: 25 MMOL/L — SIGNIFICANT CHANGE UP (ref 22–31)
CREAT SERPL-MCNC: 0.79 MG/DL — SIGNIFICANT CHANGE UP (ref 0.5–1.3)
EGFR: 84 ML/MIN/1.73M2 — SIGNIFICANT CHANGE UP
EGFR: 84 ML/MIN/1.73M2 — SIGNIFICANT CHANGE UP
EOSINOPHIL # BLD AUTO: 0.01 K/UL — SIGNIFICANT CHANGE UP (ref 0–0.5)
EOSINOPHIL NFR BLD AUTO: 0.1 % — SIGNIFICANT CHANGE UP (ref 0–6)
GLUCOSE SERPL-MCNC: 113 MG/DL — HIGH (ref 70–99)
HCT VFR BLD CALC: 41.9 % — SIGNIFICANT CHANGE UP (ref 34.5–45)
HGB BLD-MCNC: 13.8 G/DL — SIGNIFICANT CHANGE UP (ref 11.5–15.5)
IMM GRANULOCYTES # BLD AUTO: 0.05 K/UL — SIGNIFICANT CHANGE UP (ref 0–0.07)
IMM GRANULOCYTES NFR BLD AUTO: 0.5 % — SIGNIFICANT CHANGE UP (ref 0–0.9)
INR BLD: 1.08 RATIO — SIGNIFICANT CHANGE UP (ref 0.85–1.16)
LIDOCAIN IGE QN: 16 U/L — SIGNIFICANT CHANGE UP (ref 13–75)
LYMPHOCYTES # BLD AUTO: 1.09 K/UL — SIGNIFICANT CHANGE UP (ref 1–3.3)
LYMPHOCYTES NFR BLD AUTO: 10.2 % — LOW (ref 13–44)
MCHC RBC-ENTMCNC: 30.2 PG — SIGNIFICANT CHANGE UP (ref 27–34)
MCHC RBC-ENTMCNC: 32.9 G/DL — SIGNIFICANT CHANGE UP (ref 32–36)
MCV RBC AUTO: 91.7 FL — SIGNIFICANT CHANGE UP (ref 80–100)
MONOCYTES # BLD AUTO: 0.57 K/UL — SIGNIFICANT CHANGE UP (ref 0–0.9)
MONOCYTES NFR BLD AUTO: 5.3 % — SIGNIFICANT CHANGE UP (ref 2–14)
NEUTROPHILS # BLD AUTO: 8.93 K/UL — HIGH (ref 1.8–7.4)
NEUTROPHILS NFR BLD AUTO: 83.8 % — HIGH (ref 43–77)
NRBC # BLD AUTO: 0 K/UL — SIGNIFICANT CHANGE UP (ref 0–0)
NRBC # FLD: 0 K/UL — SIGNIFICANT CHANGE UP (ref 0–0)
NRBC BLD AUTO-RTO: 0 /100 WBCS — SIGNIFICANT CHANGE UP (ref 0–0)
PLATELET # BLD AUTO: 287 K/UL — SIGNIFICANT CHANGE UP (ref 150–400)
PMV BLD: 9.7 FL — SIGNIFICANT CHANGE UP (ref 7–13)
POTASSIUM SERPL-MCNC: 3.5 MMOL/L — SIGNIFICANT CHANGE UP (ref 3.5–5.3)
POTASSIUM SERPL-SCNC: 3.5 MMOL/L — SIGNIFICANT CHANGE UP (ref 3.5–5.3)
PROT SERPL-MCNC: 7 GM/DL — SIGNIFICANT CHANGE UP (ref 6–8.3)
PROTHROM AB SERPL-ACNC: 12.4 SEC — SIGNIFICANT CHANGE UP (ref 9.9–13.4)
RBC # BLD: 4.57 M/UL — SIGNIFICANT CHANGE UP (ref 3.8–5.2)
RBC # FLD: 14.8 % — HIGH (ref 10.3–14.5)
SODIUM SERPL-SCNC: 139 MMOL/L — SIGNIFICANT CHANGE UP (ref 135–145)
WBC # BLD: 10.66 K/UL — HIGH (ref 3.8–10.5)
WBC # FLD AUTO: 10.66 K/UL — HIGH (ref 3.8–10.5)

## 2025-05-01 PROCEDURE — 85027 COMPLETE CBC AUTOMATED: CPT

## 2025-05-01 PROCEDURE — 80048 BASIC METABOLIC PNL TOTAL CA: CPT

## 2025-05-01 PROCEDURE — 72148 MRI LUMBAR SPINE W/O DYE: CPT | Mod: 26

## 2025-05-01 PROCEDURE — 86900 BLOOD TYPING SEROLOGIC ABO: CPT

## 2025-05-01 PROCEDURE — 86850 RBC ANTIBODY SCREEN: CPT

## 2025-05-01 PROCEDURE — 85025 COMPLETE CBC W/AUTO DIFF WBC: CPT

## 2025-05-01 PROCEDURE — 97530 THERAPEUTIC ACTIVITIES: CPT | Mod: GP

## 2025-05-01 PROCEDURE — 82306 VITAMIN D 25 HYDROXY: CPT

## 2025-05-01 PROCEDURE — 72131 CT LUMBAR SPINE W/O DYE: CPT | Mod: 26

## 2025-05-01 PROCEDURE — 97116 GAIT TRAINING THERAPY: CPT | Mod: GP

## 2025-05-01 PROCEDURE — 93010 ELECTROCARDIOGRAM REPORT: CPT

## 2025-05-01 PROCEDURE — 85610 PROTHROMBIN TIME: CPT

## 2025-05-01 PROCEDURE — 97535 SELF CARE MNGMENT TRAINING: CPT | Mod: GO

## 2025-05-01 PROCEDURE — 97162 PT EVAL MOD COMPLEX 30 MIN: CPT | Mod: GP

## 2025-05-01 PROCEDURE — 36415 COLL VENOUS BLD VENIPUNCTURE: CPT

## 2025-05-01 PROCEDURE — 74177 CT ABD & PELVIS W/CONTRAST: CPT | Mod: MC

## 2025-05-01 PROCEDURE — 71045 X-RAY EXAM CHEST 1 VIEW: CPT

## 2025-05-01 PROCEDURE — 84702 CHORIONIC GONADOTROPIN TEST: CPT

## 2025-05-01 PROCEDURE — 97166 OT EVAL MOD COMPLEX 45 MIN: CPT | Mod: GO

## 2025-05-01 PROCEDURE — 71045 X-RAY EXAM CHEST 1 VIEW: CPT | Mod: 26

## 2025-05-01 PROCEDURE — 72148 MRI LUMBAR SPINE W/O DYE: CPT | Mod: MC

## 2025-05-01 PROCEDURE — 80053 COMPREHEN METABOLIC PANEL: CPT

## 2025-05-01 PROCEDURE — 85730 THROMBOPLASTIN TIME PARTIAL: CPT

## 2025-05-01 PROCEDURE — 99285 EMERGENCY DEPT VISIT HI MDM: CPT

## 2025-05-01 PROCEDURE — 93005 ELECTROCARDIOGRAM TRACING: CPT

## 2025-05-01 PROCEDURE — 86901 BLOOD TYPING SEROLOGIC RH(D): CPT

## 2025-05-01 PROCEDURE — 74177 CT ABD & PELVIS W/CONTRAST: CPT | Mod: 26

## 2025-05-01 PROCEDURE — 83690 ASSAY OF LIPASE: CPT

## 2025-05-01 RX ORDER — OXYCODONE HYDROCHLORIDE 30 MG/1
10 TABLET ORAL EVERY 4 HOURS
Refills: 0 | Status: DISCONTINUED | OUTPATIENT
Start: 2025-05-01 | End: 2025-05-03

## 2025-05-01 RX ORDER — CALCIUM CARBONATE 750 MG/1
1 TABLET ORAL
Refills: 0 | DISCHARGE

## 2025-05-01 RX ORDER — ESCITALOPRAM OXALATE 20 MG/1
1 TABLET ORAL
Refills: 0 | DISCHARGE

## 2025-05-01 RX ORDER — METHYLPREDNISOLONE ACETATE 80 MG/ML
0 INJECTION, SUSPENSION INTRA-ARTICULAR; INTRALESIONAL; INTRAMUSCULAR; SOFT TISSUE
Refills: 0 | DISCHARGE

## 2025-05-01 RX ORDER — OMEPRAZOLE 20 MG/1
1 CAPSULE, DELAYED RELEASE ORAL
Refills: 0 | DISCHARGE

## 2025-05-01 RX ORDER — METHYLPREDNISOLONE ACETATE 80 MG/ML
4 INJECTION, SUSPENSION INTRA-ARTICULAR; INTRALESIONAL; INTRAMUSCULAR; SOFT TISSUE ONCE
Refills: 0 | Status: DISCONTINUED | OUTPATIENT
Start: 2025-05-01 | End: 2025-05-01

## 2025-05-01 RX ORDER — ASPIRIN 325 MG
1 TABLET ORAL
Refills: 0 | DISCHARGE

## 2025-05-01 RX ORDER — POLYETHYLENE GLYCOL 3350 17 G/17G
17 POWDER, FOR SOLUTION ORAL DAILY
Refills: 0 | Status: DISCONTINUED | OUTPATIENT
Start: 2025-05-01 | End: 2025-05-06

## 2025-05-01 RX ORDER — LIDOCAINE HYDROCHLORIDE 20 MG/ML
1 JELLY TOPICAL DAILY
Refills: 0 | Status: DISCONTINUED | OUTPATIENT
Start: 2025-05-01 | End: 2025-05-06

## 2025-05-01 RX ORDER — ONDANSETRON HCL/PF 4 MG/2 ML
4 VIAL (ML) INJECTION EVERY 8 HOURS
Refills: 0 | Status: DISCONTINUED | OUTPATIENT
Start: 2025-05-01 | End: 2025-05-06

## 2025-05-01 RX ORDER — ACETAMINOPHEN 500 MG/5ML
1000 LIQUID (ML) ORAL EVERY 8 HOURS
Refills: 0 | Status: DISCONTINUED | OUTPATIENT
Start: 2025-05-01 | End: 2025-05-06

## 2025-05-01 RX ORDER — B1/B2/B3/B5/B6/B12/VIT C/FOLIC 500-0.5 MG
1 TABLET ORAL
Refills: 0 | DISCHARGE

## 2025-05-01 RX ORDER — OXYBUTYNIN CHLORIDE 5 MG/1
1 TABLET, FILM COATED, EXTENDED RELEASE ORAL
Refills: 0 | DISCHARGE

## 2025-05-01 RX ORDER — BUPROPION HYDROBROMIDE 522 MG/1
1 TABLET, EXTENDED RELEASE ORAL
Refills: 0 | DISCHARGE

## 2025-05-01 RX ORDER — ROSUVASTATIN CALCIUM 20 MG/1
1 TABLET, FILM COATED ORAL
Refills: 0 | DISCHARGE

## 2025-05-01 RX ORDER — UBIDECARENONE 100 MG
1 CAPSULE ORAL
Refills: 0 | DISCHARGE

## 2025-05-01 RX ORDER — ROSUVASTATIN CALCIUM 20 MG/1
20 TABLET, FILM COATED ORAL AT BEDTIME
Refills: 0 | Status: DISCONTINUED | OUTPATIENT
Start: 2025-05-01 | End: 2025-05-06

## 2025-05-01 RX ORDER — BUPROPION HYDROBROMIDE 522 MG/1
100 TABLET, EXTENDED RELEASE ORAL DAILY
Refills: 0 | Status: DISCONTINUED | OUTPATIENT
Start: 2025-05-01 | End: 2025-05-06

## 2025-05-01 RX ORDER — LOSARTAN POTASSIUM 100 MG/1
1 TABLET, FILM COATED ORAL
Refills: 0 | DISCHARGE

## 2025-05-01 RX ORDER — ACETAMINOPHEN 500 MG/5ML
2 LIQUID (ML) ORAL
Refills: 0 | DISCHARGE

## 2025-05-01 RX ORDER — MELOXICAM 15 MG/1
1 TABLET ORAL
Refills: 0 | DISCHARGE

## 2025-05-01 RX ORDER — MAGNESIUM, ALUMINUM HYDROXIDE 200-200 MG
30 TABLET,CHEWABLE ORAL EVERY 4 HOURS
Refills: 0 | Status: DISCONTINUED | OUTPATIENT
Start: 2025-05-01 | End: 2025-05-06

## 2025-05-01 RX ORDER — BISACODYL 5 MG
5 TABLET, DELAYED RELEASE (ENTERIC COATED) ORAL DAILY
Refills: 0 | Status: DISCONTINUED | OUTPATIENT
Start: 2025-05-01 | End: 2025-05-06

## 2025-05-01 RX ORDER — NALOXONE HYDROCHLORIDE 0.4 MG/ML
0.4 INJECTION, SOLUTION INTRAMUSCULAR; INTRAVENOUS; SUBCUTANEOUS ONCE
Refills: 0 | Status: DISCONTINUED | OUTPATIENT
Start: 2025-05-01 | End: 2025-05-06

## 2025-05-01 RX ORDER — TRAMADOL HYDROCHLORIDE 50 MG/1
1 TABLET, FILM COATED ORAL
Refills: 0 | DISCHARGE

## 2025-05-01 RX ORDER — KETOROLAC TROMETHAMINE 30 MG/ML
30 INJECTION, SOLUTION INTRAMUSCULAR; INTRAVENOUS ONCE
Refills: 0 | Status: DISCONTINUED | OUTPATIENT
Start: 2025-05-01 | End: 2025-05-01

## 2025-05-01 RX ORDER — DEXAMETHASONE 0.5 MG/1
6 TABLET ORAL DAILY
Refills: 0 | Status: DISCONTINUED | OUTPATIENT
Start: 2025-05-01 | End: 2025-05-02

## 2025-05-01 RX ORDER — LIDOCAINE HYDROCHLORIDE 20 MG/ML
1 JELLY TOPICAL ONCE
Refills: 0 | Status: COMPLETED | OUTPATIENT
Start: 2025-05-01 | End: 2025-05-01

## 2025-05-01 RX ORDER — MAGNESIUM OXIDE 400 MG
1 TABLET ORAL
Refills: 0 | DISCHARGE

## 2025-05-01 RX ORDER — GABAPENTIN 400 MG/1
300 CAPSULE ORAL THREE TIMES A DAY
Refills: 0 | Status: DISCONTINUED | OUTPATIENT
Start: 2025-05-01 | End: 2025-05-02

## 2025-05-01 RX ORDER — ESCITALOPRAM OXALATE 20 MG/1
20 TABLET ORAL DAILY
Refills: 0 | Status: DISCONTINUED | OUTPATIENT
Start: 2025-05-01 | End: 2025-05-06

## 2025-05-01 RX ORDER — DOCUSATE SODIUM 100 MG
1 CAPSULE ORAL
Refills: 0 | DISCHARGE

## 2025-05-01 RX ORDER — MELATONIN 5 MG
3 TABLET ORAL AT BEDTIME
Refills: 0 | Status: DISCONTINUED | OUTPATIENT
Start: 2025-05-01 | End: 2025-05-06

## 2025-05-01 RX ORDER — SENNA 187 MG
2 TABLET ORAL AT BEDTIME
Refills: 0 | Status: DISCONTINUED | OUTPATIENT
Start: 2025-05-01 | End: 2025-05-06

## 2025-05-01 RX ORDER — LOSARTAN POTASSIUM 100 MG/1
50 TABLET, FILM COATED ORAL DAILY
Refills: 0 | Status: DISCONTINUED | OUTPATIENT
Start: 2025-05-02 | End: 2025-05-06

## 2025-05-01 RX ORDER — OXYCODONE HYDROCHLORIDE 30 MG/1
5 TABLET ORAL EVERY 4 HOURS
Refills: 0 | Status: DISCONTINUED | OUTPATIENT
Start: 2025-05-01 | End: 2025-05-06

## 2025-05-01 RX ORDER — OXYBUTYNIN CHLORIDE 5 MG/1
5 TABLET, FILM COATED, EXTENDED RELEASE ORAL DAILY
Refills: 0 | Status: DISCONTINUED | OUTPATIENT
Start: 2025-05-01 | End: 2025-05-06

## 2025-05-01 RX ORDER — BUPROPION HYDROBROMIDE 522 MG/1
100 TABLET, EXTENDED RELEASE ORAL DAILY
Refills: 0 | Status: DISCONTINUED | OUTPATIENT
Start: 2025-05-01 | End: 2025-05-01

## 2025-05-01 RX ORDER — HEPARIN SODIUM 1000 [USP'U]/ML
5000 INJECTION INTRAVENOUS; SUBCUTANEOUS EVERY 8 HOURS
Refills: 0 | Status: DISCONTINUED | OUTPATIENT
Start: 2025-05-01 | End: 2025-05-01

## 2025-05-01 RX ORDER — OXYCODONE HYDROCHLORIDE AND ACETAMINOPHEN 10; 325 MG/1; MG/1
1 TABLET ORAL ONCE
Refills: 0 | Status: DISCONTINUED | OUTPATIENT
Start: 2025-05-01 | End: 2025-05-01

## 2025-05-01 RX ADMIN — Medication 40 MILLIEQUIVALENT(S): at 21:42

## 2025-05-01 RX ADMIN — OXYCODONE HYDROCHLORIDE AND ACETAMINOPHEN 1 TABLET(S): 10; 325 TABLET ORAL at 09:55

## 2025-05-01 RX ADMIN — ROSUVASTATIN CALCIUM 20 MILLIGRAM(S): 20 TABLET, FILM COATED ORAL at 21:43

## 2025-05-01 RX ADMIN — DEXAMETHASONE 6 MILLIGRAM(S): 0.5 TABLET ORAL at 17:17

## 2025-05-01 RX ADMIN — GABAPENTIN 300 MILLIGRAM(S): 400 CAPSULE ORAL at 21:43

## 2025-05-01 RX ADMIN — Medication 1000 MILLIGRAM(S): at 21:42

## 2025-05-01 RX ADMIN — LIDOCAINE HYDROCHLORIDE 1 PATCH: 20 JELLY TOPICAL at 09:55

## 2025-05-01 RX ADMIN — Medication 4 MILLIGRAM(S): at 13:19
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
## 2025-05-01 NOTE — ED ADULT NURSE NOTE - NSFALLRISKINTERV_ED_ALL_ED
Assistance OOB with selected safe patient handling equipment if applicable/Assistance with ambulation/Communicate fall risk and risk factors to all staff, patient, and family/Monitor gait and stability/Provide visual cue: yellow wristband, yellow gown, etc/Reinforce activity limits and safety measures with patient and family/Call bell, personal items and telephone in reach/Instruct patient to call for assistance before getting out of bed/chair/stretcher/Non-slip footwear applied when patient is off stretcher/[place] to call system/Physically safe environment - no spills, clutter or unnecessary equipment/Purposeful Proactive Rounding/Room/bathroom lighting operational, light cord in reach

## 2025-05-01 NOTE — H&P ADULT - NSICDXFAMILYHX_GEN_ALL_CORE_FT
FAMILY HISTORY:  No pertinent family history in first degree relatives     FAMILY HISTORY:  Mother  Still living? Unknown  FH: diabetes mellitus, Age at diagnosis: Age Unknown

## 2025-05-01 NOTE — H&P ADULT - NSICDXPASTMEDICALHX_GEN_ALL_CORE_FT
PAST MEDICAL HISTORY:  HTN (hypertension)     Obesity      PAST MEDICAL HISTORY:  Anxiety and depression     HTN (hypertension)     Obesity
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
## 2025-05-01 NOTE — CONSULT NOTE ADULT - SUBJECTIVE AND OBJECTIVE BOX
CHIEF COMPLAINT:     HPI: Pt is a 64y s/p mechanical fall. In ED pt complaining of back pain for which CT scan showed Fx of L3 veterbral body. Patient states 3 days ago she was helping her son move a bed when she fell back landing on her buttocks injuring her back. She initially felt mild pain that progressively worsened and the next day she went to  where an XR was performed and she was told she had a fracture. They recommended she go to the ED at the time but she refused. She returned to work the next day and made an appointment with a neurosurgeon but her pain worsened so she came the ED. In addition to pain patient admits to abnormal sensation of the right lateral thigh to the knee. No associated trauma, no recent or past back pain.  No alleviating factors including medication or positioning. Denies pain down legs, denies loss of sensation/motor function, weakness, denies incontinence of bowel or bladder.     ROS: No CP, no SOB, No night sweats, no fevers or chills, no Numbness or tingling.    PAST MEDICAL & SURGICAL HISTORY:  Arrhythmia    FAMILY HISTORY:  No pertinent family history in first degree relatives    Vital Signs Last 24 Hrs  T(C): 36.4 (01 May 2025 08:20), Max: 36.4 (01 May 2025 08:20)  T(F): 97.6 (01 May 2025 08:20), Max: 97.6 (01 May 2025 08:20)  HR: 57 (01 May 2025 08:20) (57 - 57)  BP: 152/57 (01 May 2025 08:20) (152/57 - 152/57)  BP(mean): 86 (01 May 2025 08:20) (86 - 86)  RR: 18 (01 May 2025 08:20) (18 - 18)  SpO2: 100% (01 May 2025 08:20) (100% - 100%)    LABS:                        13.8   10.66 )-----------( 287      ( 01 May 2025 13:10 )             41.9         RADIOLOGY & ADDITIONAL STUDIES:  CT Lumbar Spine:    L3-L4: Mild retropulsion of the inferior aspect of the L3 vertebral body   into the thecal sac. Moderate to severe narrowing of the bilateral   neuroforamen, right greater than left. Mild thickening of ligamentum   flavum. Mild narrowing of the spinal canal. Mild facet arthropathy.    1.  Compression fracture deformity at L3. Recommend MRI of the lumbar   spine for further evaluation of acuity.  2.  Degenerative changes of the lumbar pine.      PHYSICAL EXAM:  Physical Exam:  General: NAD, Alert, Awake and oriented  Neurologic: No gross deficits, moving all 4s.  Head: NCAT without abrasions, lacerations, or ecchymosis to head, face, or scalp  Eyes: PERRL  Heart: Pulse is regular and palpable  Respiratory: Nonlabored. Symmetric chest wall expansion bilaterally, no accessory muscle use  Abdomen: Soft, Nontender, no guarding.  LE: No Edema    Musculoskeletal:      SPINE:  C-Spine/Neck: supple, NT, Full Painless baseline AROM, no bony TTP, no step off palpable  T/L Spine: No palpable step-off. No Bony TTP. + lumbar paraspinal muscle tenderness, limited ROM secondary to pain  Neuro:   UPPER extremities:   Sensation: intact to light touch and symmetric bilaterally   Strength: Felt to be Symmetric Bilaterally 5/5 Delt, Biceps, Triceps, Wrist ext, Wrst Flex,   LOWER extremities:  Sensation: intact to light touch bilaterally, decreased/abnormal sensation to right lateral thigh from hip to knee, symmetrical otherwise throughout  Strength: Felt to be Symmetric Bilaterally IP, Quadriceps, Hamstring, EHL, FHL, TA, GS   Pleitez: Negative Bilaterally  Clonus: Neg Bilaterally  Babinski: Negative Bilaterally      RIGHT UE: No open skin. No obvious deformities or other signs of trauma at shoulder, upper arm, elbow, forearm, wrist or hand/digits.  Full baseline painless ROM at shoulder, elbow, wrist, and . No bony TTP. SILT in axillary, radial, median, and ulnar distributions. + radial pulse palpable with brisk cap refill at distal finger tips. Compartments soft and compressible of upper arm and forearm.  Strength 5/5.    LEFT UE: No open skin. No obvious deformities or other signs of trauma at shoulder, upper arm, elbow, forearm, wrist or hand/digits.  Full baseline painless ROM at shoulder, elbow, wrist, and . No bony TTP. SILT in axillary, radial, median, and ulnar distributions. +radial pulse palpable with brisk cap refill at distal finger tips. Compartments soft and compressible of upper arm and forearm.  Strength 5/5.    HIPS and PELVIS: Pelvis stable to AP and Lat compression. Able to actively SLR bilaterally. Negative Log Roll, Negative Heel strike bilaterally. No pain on internal/external rotation of the hips.    RIGHT LE: No open skin. No deformities or other signs of trauma at hip, thigh, knee, leg, ankle or foot/toes. Extensor mechanism intact. No palpable defect of of patella or quad tendon. Full baseline painless ROM at hip, knee, ankle and toes with negative log-roll and heel strike. Able to actively SLR. SILT toes 1-5. No bony TTP to hip, knee or ankle. + DP/PT pulses palpable with brisk cap refill distally. Compartments soft and compressible of thigh and lower leg.  Strength 5/5. Plantar dorsiflexion 5/5. No ankle instability. No pain on Axial loading of leg.    LEFT LE: No open skin. No deformities  or other signs of trauma at hip, thigh, knee, leg, ankle or foot/toes.  Extensor mechanism intact. No palpable defect of of patella or quad tendon. Full baseline painless ROM at hip, knee, ankle and toe with negative log-roll and heel strike. Able to actively SLR. SILT toes 1-5. No bony TTP to hip, knee or ankle. + DP/PT pulses palpable with brisk cap refill distally. Compartments soft and compressible of thigh and lower leg. Strength 5/5. Plantar dorsiflexion 5/5. No ankle instability. No pain on Axial loading of leg.                                              A/P:    MRI lumbar spine ordered- result to be followed  Pain control  Admit to medicine                                                          CHIEF COMPLAINT:     HPI: Pt is a 64y s/p mechanical fall. In ED pt complaining of back pain for which CT scan showed Fx of L3 vertebral body. Patient states 3 days ago she was helping her son move a bed when she fell back landing on her buttocks injuring her back. She initially felt mild pain that progressively worsened and the next day she went to  where an XR was performed and she was told she had a fracture. They recommended she go to the ED at the time but she refused. She returned to work the next day and made an appointment with a neurosurgeon but her pain worsened so she came the ED. In addition to pain patient admits to abnormal sensation of the right lateral thigh to the knee. No associated trauma, no recent or past back pain.  No alleviating factors including medication or positioning. Denies pain down legs, denies loss of sensation/motor function, weakness, denies incontinence of bowel or bladder.     ROS: No CP, no SOB, No night sweats, no fevers or chills, no Numbness or tingling.    PAST MEDICAL & SURGICAL HISTORY:  Arrhythmia    FAMILY HISTORY:  No pertinent family history in first degree relatives    Vital Signs Last 24 Hrs  T(C): 36.4 (01 May 2025 08:20), Max: 36.4 (01 May 2025 08:20)  T(F): 97.6 (01 May 2025 08:20), Max: 97.6 (01 May 2025 08:20)  HR: 57 (01 May 2025 08:20) (57 - 57)  BP: 152/57 (01 May 2025 08:20) (152/57 - 152/57)  BP(mean): 86 (01 May 2025 08:20) (86 - 86)  RR: 18 (01 May 2025 08:20) (18 - 18)  SpO2: 100% (01 May 2025 08:20) (100% - 100%)    LABS:                        13.8   10.66 )-----------( 287      ( 01 May 2025 13:10 )             41.9         RADIOLOGY & ADDITIONAL STUDIES:  CT Lumbar Spine:    L3-L4: Mild retropulsion of the inferior aspect of the L3 vertebral body   into the thecal sac. Moderate to severe narrowing of the bilateral   neuroforamen, right greater than left. Mild thickening of ligamentum   flavum. Mild narrowing of the spinal canal. Mild facet arthropathy.    1.  Compression fracture deformity at L3. Recommend MRI of the lumbar   spine for further evaluation of acuity.  2.  Degenerative changes of the lumbar pine.      PHYSICAL EXAM:  Physical Exam:  General: NAD, Alert, Awake and oriented  Neurologic: No gross deficits, moving all 4s.  Head: NCAT without abrasions, lacerations, or ecchymosis to head, face, or scalp  Eyes: PERRL  Heart: Pulse is regular and palpable  Respiratory: Nonlabored. Symmetric chest wall expansion bilaterally, no accessory muscle use  Abdomen: Soft, Nontender, no guarding.  LE: No Edema    Musculoskeletal:      SPINE:  C-Spine/Neck: supple, NT, Full Painless baseline AROM, no bony TTP, no step off palpable  T/L Spine: No palpable step-off. No Bony TTP. + lumbar paraspinal muscle tenderness, limited ROM secondary to pain  Neuro:   UPPER extremities:   Sensation: intact to light touch and symmetric bilaterally   Strength: Felt to be Symmetric Bilaterally 5/5 Delt, Biceps, Triceps, Wrist ext, Wrst Flex,   LOWER extremities:  Sensation: intact to light touch bilaterally, decreased/abnormal sensation to right lateral thigh from hip to knee, symmetrical otherwise throughout  Strength: Felt to be Symmetric Bilaterally IP, Quadriceps, Hamstring, EHL, FHL, TA, GS   Pleitez: Negative Bilaterally  Clonus: Neg Bilaterally  Babinski: Negative Bilaterally      RIGHT UE: No open skin. No obvious deformities or other signs of trauma at shoulder, upper arm, elbow, forearm, wrist or hand/digits.  Full baseline painless ROM at shoulder, elbow, wrist, and . No bony TTP. SILT in axillary, radial, median, and ulnar distributions. + radial pulse palpable with brisk cap refill at distal finger tips. Compartments soft and compressible of upper arm and forearm.  Strength 5/5.    LEFT UE: No open skin. No obvious deformities or other signs of trauma at shoulder, upper arm, elbow, forearm, wrist or hand/digits.  Full baseline painless ROM at shoulder, elbow, wrist, and . No bony TTP. SILT in axillary, radial, median, and ulnar distributions. +radial pulse palpable with brisk cap refill at distal finger tips. Compartments soft and compressible of upper arm and forearm.  Strength 5/5.    HIPS and PELVIS: Pelvis stable to AP and Lat compression. Able to actively SLR bilaterally. Negative Log Roll, Negative Heel strike bilaterally. No pain on internal/external rotation of the hips.    RIGHT LE: No open skin. No deformities or other signs of trauma at hip, thigh, knee, leg, ankle or foot/toes. Extensor mechanism intact. No palpable defect of of patella or quad tendon. Full baseline painless ROM at hip, knee, ankle and toes with negative log-roll and heel strike. Able to actively SLR. SILT toes 1-5. No bony TTP to hip, knee or ankle. + DP/PT pulses palpable with brisk cap refill distally. Compartments soft and compressible of thigh and lower leg.  Strength 5/5. Plantar dorsiflexion 5/5. No ankle instability. No pain on Axial loading of leg.    LEFT LE: No open skin. No deformities  or other signs of trauma at hip, thigh, knee, leg, ankle or foot/toes.  Extensor mechanism intact. No palpable defect of of patella or quad tendon. Full baseline painless ROM at hip, knee, ankle and toe with negative log-roll and heel strike. Able to actively SLR. SILT toes 1-5. No bony TTP to hip, knee or ankle. + DP/PT pulses palpable with brisk cap refill distally. Compartments soft and compressible of thigh and lower leg. Strength 5/5. Plantar dorsiflexion 5/5. No ankle instability. No pain on Axial loading of leg.                                              A/P:    MRI lumbar spine ordered- result to be followed  Pain control  WBAT  NPO pending MRI  Admit to medicine                                                          CHIEF COMPLAINT:     HPI: Pt is a 64y s/p mechanical fall. In ED pt complaining of back pain for which CT scan showed Fx of L3 vertebral body. Patient states 3 days ago she was helping her son move a bed when she fell back landing on her buttocks injuring her back. She initially felt mild pain that progressively worsened and the next day she went to  where an XR was performed and she was told she had a fracture. They recommended she go to the ED at the time but she refused. She returned to work the next day and made an appointment with a neurosurgeon but her pain worsened so she came the ED. In addition to pain patient admits to abnormal sensation of the right lateral thigh to the knee. No associated trauma, no recent or past back pain.  No alleviating factors including medication or positioning. Denies pain down legs, denies loss of sensation/motor function, weakness, denies incontinence of bowel or bladder.     ROS: No CP, no SOB, No night sweats, no fevers or chills, no Numbness or tingling.    PAST MEDICAL & SURGICAL HISTORY:  Arrhythmia    FAMILY HISTORY:  No pertinent family history in first degree relatives    Vital Signs Last 24 Hrs  T(C): 36.4 (01 May 2025 08:20), Max: 36.4 (01 May 2025 08:20)  T(F): 97.6 (01 May 2025 08:20), Max: 97.6 (01 May 2025 08:20)  HR: 57 (01 May 2025 08:20) (57 - 57)  BP: 152/57 (01 May 2025 08:20) (152/57 - 152/57)  BP(mean): 86 (01 May 2025 08:20) (86 - 86)  RR: 18 (01 May 2025 08:20) (18 - 18)  SpO2: 100% (01 May 2025 08:20) (100% - 100%)    LABS:                        13.8   10.66 )-----------( 287      ( 01 May 2025 13:10 )             41.9         RADIOLOGY & ADDITIONAL STUDIES:  CT Lumbar Spine:    L3-L4: Mild retropulsion of the inferior aspect of the L3 vertebral body   into the thecal sac. Moderate to severe narrowing of the bilateral   neuroforamen, right greater than left. Mild thickening of ligamentum   flavum. Mild narrowing of the spinal canal. Mild facet arthropathy.    1.  Compression fracture deformity at L3. Recommend MRI of the lumbar   spine for further evaluation of acuity.  2.  Degenerative changes of the lumbar pine.      PHYSICAL EXAM:  Physical Exam:  General: NAD, Alert, Awake and oriented  Neurologic: No gross deficits, moving all 4s.  Head: NCAT without abrasions, lacerations, or ecchymosis to head, face, or scalp  Eyes: PERRL  Heart: Pulse is regular and palpable  Respiratory: Nonlabored. Symmetric chest wall expansion bilaterally, no accessory muscle use  Abdomen: Soft, Nontender, no guarding.  LE: No Edema    Musculoskeletal:      SPINE:  C-Spine/Neck: supple, NT, Full Painless baseline AROM, no bony TTP, no step off palpable  T/L Spine: No palpable step-off. No Bony TTP. + lumbar paraspinal muscle tenderness, limited ROM secondary to pain  Neuro:   UPPER extremities:   Sensation: intact to light touch and symmetric bilaterally   Strength: Felt to be Symmetric Bilaterally 5/5 Delt, Biceps, Triceps, Wrist ext, Wrst Flex,   LOWER extremities:  Sensation: intact to light touch bilaterally, decreased/abnormal sensation to right lateral thigh from hip to knee, symmetrical otherwise throughout  Strength: Felt to be Symmetric Bilaterally IP, Quadriceps, Hamstring, EHL, FHL, TA, GS   Pleitez: Negative Bilaterally  Clonus: Neg Bilaterally  Babinski: Negative Bilaterally      RIGHT UE: No open skin. No obvious deformities or other signs of trauma at shoulder, upper arm, elbow, forearm, wrist or hand/digits.  Full baseline painless ROM at shoulder, elbow, wrist, and . No bony TTP. SILT in axillary, radial, median, and ulnar distributions. + radial pulse palpable with brisk cap refill at distal finger tips. Compartments soft and compressible of upper arm and forearm.  Strength 5/5.    LEFT UE: No open skin. No obvious deformities or other signs of trauma at shoulder, upper arm, elbow, forearm, wrist or hand/digits.  Full baseline painless ROM at shoulder, elbow, wrist, and . No bony TTP. SILT in axillary, radial, median, and ulnar distributions. +radial pulse palpable with brisk cap refill at distal finger tips. Compartments soft and compressible of upper arm and forearm.  Strength 5/5.    HIPS and PELVIS: Pelvis stable to AP and Lat compression. Able to actively SLR bilaterally. Negative Log Roll, Negative Heel strike bilaterally. No pain on internal/external rotation of the hips.    RIGHT LE: No open skin. No deformities or other signs of trauma at hip, thigh, knee, leg, ankle or foot/toes. Extensor mechanism intact. No palpable defect of of patella or quad tendon. Full baseline painless ROM at hip, knee, ankle and toes with negative log-roll and heel strike. Able to actively SLR. SILT toes 1-5. No bony TTP to hip, knee or ankle. + DP/PT pulses palpable with brisk cap refill distally. Compartments soft and compressible of thigh and lower leg.  Strength 5/5. Plantar dorsiflexion 5/5. No ankle instability. No pain on Axial loading of leg.    LEFT LE: No open skin. No deformities  or other signs of trauma at hip, thigh, knee, leg, ankle or foot/toes.  Extensor mechanism intact. No palpable defect of of patella or quad tendon. Full baseline painless ROM at hip, knee, ankle and toe with negative log-roll and heel strike. Able to actively SLR. SILT toes 1-5. No bony TTP to hip, knee or ankle. + DP/PT pulses palpable with brisk cap refill distally. Compartments soft and compressible of thigh and lower leg. Strength 5/5. Plantar dorsiflexion 5/5. No ankle instability. No pain on Axial loading of leg.      < from: MR Lumbar Spine No Cont (05.01.25 @ 15:52) >  FINDINGS:    LOCALIZER: No additional findings.  BONES: Acute fracture of the inferior L3 endplate with mild bony   retropulsion. Small epidural hematoma along the posterior aspect of the   L3 vertebral body. These findings result in mild spinal canal narrowing   at the L3 level.    Hemangiomas in the L1 and L2 vertebral bodies.    ALIGNMENT: Lumbar levoscoliosis.  SACROILIAC JOINTS/SACRUM: There is no sacral fracture. The SI joints are   partially visualized but are intact.  CONUS AND CAUDA EQUINA: The distal cord and conus are normal in signal.   Conus terminates at L1.  VISUALIZED INTRAPELVIC/INTRA-ABDOMINAL SOFT TISSUES: Normal.  PARASPINAL SOFT TISSUES: Normal.      INDIVIDUAL LEVELS:    LOWER THORACIC SPINE: No spinal canal or neuroforaminal stenosis.    L1-L2: Disc bulge. No significant spinal canal stenosis. Mild left   neuroforaminal stenosis. No right neuroforaminal stenosis.  L2-L3: No spinal canal or neuroforaminal stenosis.  L3-L4: Disc bulge. Mild spinal canal stenosis. Mild bilateral   neuroforaminal stenosis.  L4-L5: Disc bulge. No spinal canal stenosis. Mild bilateral   neuroforaminal stenosis.  L5-S1: No spinal canal or neuroforaminal stenosis.      IMPRESSION:  Acute fracture of the inferior L3 endplate with mild bony retropulsion.   Small epidural hematoma along the posterior aspect of the L3 vertebral   body. These findings result in mild spinal canal narrowing at the L3   level.    < end of copied text >     A/P: 64F with L3 burst fracture and small epidural hematoma, with mild numbness in right L3 region, no red flag symptoms    - WBAT  - Ordered LSO brace for comfort when ambulating  - PT/OT  - Pain control as needed  - Recommend gabapentin 300mg TID  - No plan 5/1 for operative intervention  - Will reassess her tomorrow morning for need for possible operative intervention  - NPO at midnight except meds, IVF while NPO, AM preop labs  - Hold chemical DVT ppx in the setting of epidural hematoma, SCDs okay    Discussed with Dr. Steel and will update with any further recommendations

## 2025-05-01 NOTE — H&P ADULT - NSHPPHYSICALEXAM_GEN_ALL_CORE
Constitutional: NAD, awake and alert, obese  HEENT: PERRLA, EOMI, MMM  Respiratory: Breath sounds are clear bilaterally, No wheezing, rales or rhonchi  Cardiovascular: S1 and S2, RRR, no murmurs, gallops or rubs  Gastrointestinal: +BS, soft, non-tender, non-distended, no CVA tenderness  Extremities: No peripheral edema, +DP pulses b/l  Neurological: A&O x 3, no focal deficits  Musculoskeletal: 5/5 strength b/l upper and lower extremities  Skin: Normal, skin warm and dry

## 2025-05-01 NOTE — H&P ADULT - HISTORY OF PRESENT ILLNESS
65 y/o female with PMHx of morbid obesity,  HTN, HLD, depression, chronic lower back pain in ED c/o worsening lower back pain x 4 days s/p slip and fall onto buttocks. pt states that she was helping her son move his bed when he pushed to bed too hard knocking her backwards onto her buttocks.   pt states got up immediately, denies LOC or head strike. States has been taking her pain meds that she take for her chronic knee pain with some relief.  Reports she was seen at  2 days ago and was told may have small compression fx on XR and given prednisone with some improvement. Pt states today pain radiating down her LLE associated with numbness prompting ED visit. Pt otherwise denies any weakness, bowel or bladder incontinence, chest pain, SOB, f/c/n/v/d, abd pain, LE edema, dysuria, HA, dizziness.     In ED BP slightly elevated, rest of VSS and afebrile. CT lumbar spine with compression fracture deformity at L3, degenerative changes. Labs overall unremarkable. ED consulted ortho spine Dr. Reid who recommended MRI lumbar spine and admission to med/surg for further management.  63 y/o female with PMHx of morbid obesity, HTN, HLD, severe FRANCHESKA (on CPAP), depression, chronic lower back pain in ED c/o worsening lower back pain x 4 days s/p slip and fall onto buttocks. pt states that she was helping her son move his bed when he pushed to bed too hard knocking her backwards onto her buttocks, got up immediately, denies LOC or head strike. States has been taking her pain meds that she take for her chronic knee pain with some relief.  Reports she was seen at  2 days ago and was told may have small compression fx on XR and given medrol dose pack with some improvement. Pt states today pain radiating down her RLE associated with numbness prompting ED visit. Pt otherwise denies any weakness, bowel or bladder incontinence, chest pain, SOB, f/c/n/v/d, abd pain, LE edema, dysuria, HA, dizziness.     In ED BP slightly elevated, rest of VSS and afebrile. CT lumbar spine with compression fracture deformity at L3, degenerative changes. Labs overall unremarkable. ED consulted ortho spine Dr. Reid who recommended MRI lumbar spine and admission to med/surg for further management.

## 2025-05-01 NOTE — ED ADULT TRIAGE NOTE - CHIEF COMPLAINT QUOTE
Pt BIB wheelchair C/O back pain since monday s/p mechanical trip and fall, -HS, -LOC. Pt F/U with UC and given steroids with relief. Endorsing difficulty ambulating

## 2025-05-01 NOTE — H&P ADULT - NSHPLABSRESULTS_GEN_ALL_CORE
05-01-25 @ 13:10  Glucose 113 [70 - 99]  CO2 total 25 [22 - 31]  Chloride 108 [96 - 108]  Sodium 139 [135 - 145]  Potassium 3.5 [3.5 - 5.3]  Calcium 9.6 [8.5 - 10.1]  Creatinine 0.79 [0.50 - 1.30]  BUN 15 [7 - 23]  eGFR 84  Anion gap 6 [5 - 17]  AST 18 [15 - 37]  ALT 25 [12 - 78]  Alk phos 72 [40 - 120]  Albumin 3.3 [3.3 - 5.0]    WBC 10.66 [3.80 - 10.50]  Hemoglobin 13.8 [11.5 - 15.5]  Hematocrit 41.9 [34.5 - 45.0]  Platelets 287 [150 - 400]    < from: CT Lumbar Spine No Cont (05.01.25 @ 10:16) >  IMPRESSION:    1.  Compression fracture deformity at L3. Recommend MRI of the lumbar   spine for further evaluation of acuity.  2.  Degenerative changes of the lumbar pine.    Large hiatal hernia. 1.8 cm   hypodensity in the right lobe of the liver, image 52 of series 3 and 1.6   cm hypodensity in the left lobe of liver, image 32 of series3. Ovoid   soft tissue mass in the right adrenal gland measuring approximately 3.1 x   4.3 cm. Pelvic fullness to the bilateral kidneys, left greater than   right. Diverticulosis without fito evidence of diverticulitis in the   sigmoid colon.

## 2025-05-01 NOTE — H&P ADULT - ASSESSMENT
63 y/o female with PMHx of morbid obesity,  HTN, HLD, depression, chronic lower back pain in ED c/o worsening lower back pain x 4 days s/p slip and fall onto buttocks. pt states that she was helping her son move his bed when he pushed to bed too hard knocking her backwards onto her buttocks.   pt states got up immediately, denies LOC or head strike. States has been taking her pain meds that she take for her chronic knee pain with some relief.  Reports she was seen at  2 days ago and was told may have small compression fx on XR and given prednisone with some improvement. Pt states today pain radiating down her LLE associated with numbness prompting ED visit. Pt otherwise denies any weakness, bowel or bladder incontinence, chest pain, SOB, f/c/n/v/d, abd pain, LE edema, dysuria, HA, dizziness.     In ED BP slightly elevated, rest of VSS and afebrile. CT lumbar spine with compression fracture deformity at L3, degenerative changes. Labs overall unremarkable. ED consulted ortho spine Dr. Reid who recommended MRI lumbar spine and admission to med/surg for further management.  63 y/o female with PMHx of morbid obesity,  HTN, HLD, depression, chronic lower back pain in ED c/o worsening lower back pain x 4 days s/p slip and fall onto buttocks. pt states that she was helping her son move his bed when he pushed to bed too hard knocking her backwards onto her buttocks. pt states got up immediately, denies LOC or head strike. States has been taking her pain meds that she take for her chronic knee pain with some relief.  Reports she was seen at  2 days ago and was told may have small compression fx on XR and given prednisone with some improvement. Pt states today pain radiating down her RLE associated with numbness prompting ED visit. Admitted for:    #S/p mechanical fall  #L3 compression fracture deformity with radiculopathy   - admit to med/surg  - denies LOC or head strike   - CT lumbar spine with compression fracture deformity at L3, degenerative changes  - Ortho spine consulted Dr. Reid / Damián, f/u further recs   - Check MRI lumbar spine to better evaluate   - NPO for now pending MRI  - continue multimodal pain control PRN  - was on medrol dose pack, change to IV dexamethasone for now   - WBAT   - PT eval    #Abnormal CTa   - CT lumbar spine with multiple incidental abdominal/pelvic findings   - Check CT abd/pelvis with contrast     #HTN/HLD  - Continue losartan/statin, BP likely exacerbated due to pain     #Severe FRANCHESKA, morbid obesity  - Continue CPAP qHS  - Nutrition consult  - Takes Zepbound (Tirzepatide) once a week for weight loss, last dose on Monday 4/28    #Depression/anxiety  - continue wellbutrin, lexapro     #GERD  - PPI  #DVT ppx  - heparin SQ         63 y/o female with PMHx of morbid obesity,  HTN, HLD, depression, chronic lower back pain in ED c/o worsening lower back pain x 4 days s/p slip and fall onto buttocks. pt states that she was helping her son move his bed when he pushed to bed too hard knocking her backwards onto her buttocks. pt states got up immediately, denies LOC or head strike. States has been taking her pain meds that she take for her chronic knee pain with some relief.  Reports she was seen at  2 days ago and was told may have small compression fx on XR and given prednisone with some improvement. Pt states today pain radiating down her RLE associated with numbness prompting ED visit. Admitted for:    #S/p mechanical fall  #L3 compression fracture deformity with radiculopathy   - admit to med/surg  - denies LOC or head strike   - CT lumbar spine with compression fracture deformity at L3, degenerative changes  - Ortho spine consulted Dr. Reid / Damián, f/u further recs   - Check MRI lumbar spine to better evaluate   - NPO for now pending MRI  - continue multimodal pain control PRN  - was on medrol dose pack, change to IV dexamethasone for now   - WBAT   - PT eval    #Abnormal CTa   - CT lumbar spine with multiple incidental abdominal/pelvic findings   - Check CT abd/pelvis with contrast     #HTN/HLD  - Continue losartan/statin, BP likely exacerbated due to pain     #Severe FRANCHESKA, morbid obesity  - Continue CPAP qHS  - Nutrition consult  - Takes Zepbound (Tirzepatide) once a week for weight loss, last dose on Monday 4/28    #Depression/anxiety  - continue wellbutrin, lexapro     #GERD  - PPI#DVT ppx  - heparin SQ

## 2025-05-01 NOTE — PATIENT PROFILE ADULT - FALL HARM RISK - HARM RISK INTERVENTIONS
Assistance with ambulation/Assistance OOB with selected safe patient handling equipment/Communicate Risk of Fall with Harm to all staff/Discuss with provider need for PT consult/Monitor gait and stability/Reinforce activity limits and safety measures with patient and family/Tailored Fall Risk Interventions/Visual Cue: Yellow wristband and red socks/Bed in lowest position, wheels locked, appropriate side rails in place/Call bell, personal items and telephone in reach/Instruct patient to call for assistance before getting out of bed or chair/Non-slip footwear when patient is out of bed/[place] to call system/Physically safe environment - no spills, clutter or unnecessary equipment/Purposeful Proactive Rounding/Room/bathroom lighting operational, light cord in reach

## 2025-05-01 NOTE — PATIENT PROFILE ADULT - NSPROPTRIGHTNOTIFY_GEN_A_NUR
Patient is A & O x4, anxious, fidgety, up and down, unable to relax post PRN sleep medications, given ativan with improvement; ST, -110s, CVPs 15-25 despite diuresing; PA pressures elevated (see flowsheet); VSS; on RA; denies pain, dobutamine gtt running, adequate UOP, large BM with red streaks, patient was straining to have a BM, up ad aliya.  Continue with current plan of care and notify MD as needed.     Linda Kincaid RN    declines

## 2025-05-01 NOTE — ED PROVIDER NOTE - PSYCHIATRIC, MLM
67F uncontrolled DM2 HbA1c 10.1% on no home DM meds currently.  Used to take metformin, possibly a SGLT2i, a medication starting with an X (Xigduo?) and possibly a sulfonylurea in the past.  Patient stopped the "X" medication and the medication to help her kidney (SGLT2i?) due to cost.  Patient denies prior side effects to metformin.  Patient adamantly refuses to consider metformin although is unable to provide a reason other than that she wants to try something else.  Will recommend 2 agents that are cost acceptable to patient.  Send options to pharmacy to check coverage.  Prescribe glucometer and teach patient how to use.  Follow with PCP on discharge.    Frederic Moreno MD  Division of Endocrinology  Pager: [phone]    If after 6PM or before 9AM, or on weekends/holidays, please call endocrine answering service for assistance ([phone]).  For nonurgent matters email [email or web address] for assistance. 67F uncontrolled DM2 HbA1c 10.1% on no home DM meds currently.  Used to take metformin, possibly a SGLT2i, a medication starting with an X (Xigduo?) and possibly a sulfonylurea in the past.  Patient stopped the "X" medication and the medication to help her kidney (SGLT2i?) due to cost.  Patient denies prior side effects to metformin.  Patient adamantly refuses to consider metformin although is unable to provide a reason other than that she wants to try something else.  Will recommend 2 agents that are cost acceptable to patient.  Send options to pharmacy to check coverage.  Prescribe glucometer and teach patient how to use.  Follow with PCP on discharge.    Frederic Moreno MD  Division of Endocrinology  Pager: [phone]    If after 6PM or before 9AM, or on weekends/holidays, please call endocrine answering service for assistance ([phone]).  For nonurgent matters email [email or web address] for assistance. Alert and oriented to person, place, time/situation. normal mood and affect. no apparent risk to self or others.

## 2025-05-01 NOTE — ED PROVIDER NOTE - SKIN, MLM
Skin normal color for race, warm, dry and intact. No evidence of rash.   numerous bruising to bilateral buttocks (pt states from her using her knuckles to message herself)

## 2025-05-01 NOTE — ED ADULT TRIAGE NOTE - PAIN RATING/NUMBER SCALE (0-10): ACTIVITY
"Subjective:      Patient ID: Cynthia Newell is a 53 y.o. female.    Vitals:  height is 5' 2" (1.575 m) and weight is 77.6 kg (171 lb). Her oral temperature is 98.3 °F (36.8 °C). Her blood pressure is 149/98 (abnormal) and her pulse is 115 (abnormal). Her respiration is 14 and oxygen saturation is 99%.     Chief Complaint: Suture / Staple Removal    52 y/o female presents for suture removal for sutures to the R hand placed 9/10/23.    Suture / Staple Removal  The sutures were placed 11 to 14 days ago (9/10/23). She tried oral antibiotics and regular soap and water washings since the wound repair. The treatment provided significant relief. There has been no drainage from the wound. The redness has improved. The swelling has improved. The pain has improved. She has no difficulty moving the affected extremity or digit.       Constitution: Negative for sweating, fatigue and fever.   Musculoskeletal:  Negative for pain.   Skin:  Positive for wound. Negative for erythema.   Neurological:  Negative for numbness and tingling.      Objective:     Physical Exam   Constitutional: She is oriented to person, place, and time. normal  Eyes: Conjunctivae are normal. Pupils are equal, round, and reactive to light. Extraocular movement intact   Abdominal: Normal appearance.   Musculoskeletal: Normal range of motion.         General: No swelling or tenderness. Normal range of motion.   Neurological: She is alert and oriented to person, place, and time.   Skin: Skin is warm and dry. No erythema         Comments: 2 sutures noted to palmar aspect of right hand, healing well no signs of infection present.        Assessment:     1. Encounter for removal of sutures        Plan:   No signs of infection. Pt completed abx. 2 sutures removed in clinic. Skin left open to air. RTC precautions discussed.    Encounter for removal of sutures                    " 6 (moderate pain)

## 2025-05-01 NOTE — ED PROVIDER NOTE - OBJECTIVE STATEMENT
65 y/o female with h/o lower back pain in ED c/o worsening lower back pain x 4 days s/p slip and fall onto buttocks.   pt states that she was helping her son move his bed when he pushed to bed too hard knocking her backwards onto her buttocks.   pt states got up immediately.   pt deneis any LOC, HA, head trauma, neck pain, cp, sob, n/v/d/abd pain.   states has been taking her pain meds that she take for her chronic knee pain with some relief.   pt states seen at  2 days ago and was told may have small compression fx on XR and given prednisone with some improvement.   pt states today pain radiating down her LLE prompting ED visit.   pt denies any weakness.   states some numbness to LLE.

## 2025-05-02 LAB
ANION GAP SERPL CALC-SCNC: 6 MMOL/L — SIGNIFICANT CHANGE UP (ref 5–17)
APTT BLD: 32.8 SEC — SIGNIFICANT CHANGE UP (ref 26.1–36.8)
BUN SERPL-MCNC: 15 MG/DL — SIGNIFICANT CHANGE UP (ref 7–23)
CALCIUM SERPL-MCNC: 9.2 MG/DL — SIGNIFICANT CHANGE UP (ref 8.5–10.1)
CHLORIDE SERPL-SCNC: 110 MMOL/L — HIGH (ref 96–108)
CO2 SERPL-SCNC: 24 MMOL/L — SIGNIFICANT CHANGE UP (ref 22–31)
CREAT SERPL-MCNC: 0.72 MG/DL — SIGNIFICANT CHANGE UP (ref 0.5–1.3)
EGFR: 94 ML/MIN/1.73M2 — SIGNIFICANT CHANGE UP
EGFR: 94 ML/MIN/1.73M2 — SIGNIFICANT CHANGE UP
GLUCOSE SERPL-MCNC: 119 MG/DL — HIGH (ref 70–99)
HCG SERPL-ACNC: 1 MIU/ML — SIGNIFICANT CHANGE UP
HCT VFR BLD CALC: 42.4 % — SIGNIFICANT CHANGE UP (ref 34.5–45)
HGB BLD-MCNC: 13.5 G/DL — SIGNIFICANT CHANGE UP (ref 11.5–15.5)
INR BLD: 1.1 RATIO — SIGNIFICANT CHANGE UP (ref 0.85–1.16)
MCHC RBC-ENTMCNC: 29.7 PG — SIGNIFICANT CHANGE UP (ref 27–34)
MCHC RBC-ENTMCNC: 31.8 G/DL — LOW (ref 32–36)
MCV RBC AUTO: 93.4 FL — SIGNIFICANT CHANGE UP (ref 80–100)
NRBC # BLD AUTO: 0 K/UL — SIGNIFICANT CHANGE UP (ref 0–0)
NRBC # FLD: 0 K/UL — SIGNIFICANT CHANGE UP (ref 0–0)
NRBC BLD AUTO-RTO: 0 /100 WBCS — SIGNIFICANT CHANGE UP (ref 0–0)
PLATELET # BLD AUTO: 267 K/UL — SIGNIFICANT CHANGE UP (ref 150–400)
PMV BLD: 9.8 FL — SIGNIFICANT CHANGE UP (ref 7–13)
POTASSIUM SERPL-MCNC: 3.6 MMOL/L — SIGNIFICANT CHANGE UP (ref 3.5–5.3)
POTASSIUM SERPL-SCNC: 3.6 MMOL/L — SIGNIFICANT CHANGE UP (ref 3.5–5.3)
PROTHROM AB SERPL-ACNC: 13 SEC — SIGNIFICANT CHANGE UP (ref 9.9–13.4)
RBC # BLD: 4.54 M/UL — SIGNIFICANT CHANGE UP (ref 3.8–5.2)
RBC # FLD: 14.6 % — HIGH (ref 10.3–14.5)
SODIUM SERPL-SCNC: 140 MMOL/L — SIGNIFICANT CHANGE UP (ref 135–145)
WBC # BLD: 6.97 K/UL — SIGNIFICANT CHANGE UP (ref 3.8–10.5)
WBC # FLD AUTO: 6.97 K/UL — SIGNIFICANT CHANGE UP (ref 3.8–10.5)

## 2025-05-02 PROCEDURE — 99232 SBSQ HOSP IP/OBS MODERATE 35: CPT

## 2025-05-02 PROCEDURE — 93010 ELECTROCARDIOGRAM REPORT: CPT

## 2025-05-02 RX ORDER — MAGNESIUM OXIDE 400 MG
400 TABLET ORAL DAILY
Refills: 0 | Status: DISCONTINUED | OUTPATIENT
Start: 2025-05-02 | End: 2025-05-06

## 2025-05-02 RX ORDER — GABAPENTIN 400 MG/1
400 CAPSULE ORAL THREE TIMES A DAY
Refills: 0 | Status: DISCONTINUED | OUTPATIENT
Start: 2025-05-02 | End: 2025-05-06

## 2025-05-02 RX ORDER — CYCLOBENZAPRINE HYDROCHLORIDE 15 MG/1
10 CAPSULE, EXTENDED RELEASE ORAL THREE TIMES A DAY
Refills: 0 | Status: DISCONTINUED | OUTPATIENT
Start: 2025-05-02 | End: 2025-05-06

## 2025-05-02 RX ADMIN — GABAPENTIN 400 MILLIGRAM(S): 400 CAPSULE ORAL at 13:37

## 2025-05-02 RX ADMIN — ROSUVASTATIN CALCIUM 20 MILLIGRAM(S): 20 TABLET, FILM COATED ORAL at 23:01

## 2025-05-02 RX ADMIN — Medication 1000 MILLIGRAM(S): at 13:36

## 2025-05-02 RX ADMIN — Medication 100 MILLILITER(S): at 00:01

## 2025-05-02 RX ADMIN — LOSARTAN POTASSIUM 50 MILLIGRAM(S): 100 TABLET, FILM COATED ORAL at 10:19

## 2025-05-02 RX ADMIN — LIDOCAINE HYDROCHLORIDE 1 PATCH: 20 JELLY TOPICAL at 19:20

## 2025-05-02 RX ADMIN — Medication 1000 MILLIGRAM(S): at 05:30

## 2025-05-02 RX ADMIN — GABAPENTIN 300 MILLIGRAM(S): 400 CAPSULE ORAL at 05:31

## 2025-05-02 RX ADMIN — LIDOCAINE HYDROCHLORIDE 1 PATCH: 20 JELLY TOPICAL at 22:20

## 2025-05-02 RX ADMIN — Medication 1000 MILLIGRAM(S): at 23:01

## 2025-05-02 RX ADMIN — OXYBUTYNIN CHLORIDE 5 MILLIGRAM(S): 5 TABLET, FILM COATED, EXTENDED RELEASE ORAL at 10:21

## 2025-05-02 RX ADMIN — BUPROPION HYDROBROMIDE 100 MILLIGRAM(S): 522 TABLET, EXTENDED RELEASE ORAL at 10:17

## 2025-05-02 RX ADMIN — CYCLOBENZAPRINE HYDROCHLORIDE 10 MILLIGRAM(S): 15 CAPSULE, EXTENDED RELEASE ORAL at 23:02

## 2025-05-02 RX ADMIN — ESCITALOPRAM OXALATE 20 MILLIGRAM(S): 20 TABLET ORAL at 10:21

## 2025-05-02 RX ADMIN — Medication 500 MILLIGRAM(S): at 16:03

## 2025-05-02 RX ADMIN — Medication 40 MILLIGRAM(S): at 05:31

## 2025-05-02 RX ADMIN — GABAPENTIN 400 MILLIGRAM(S): 400 CAPSULE ORAL at 23:01

## 2025-05-02 RX ADMIN — LIDOCAINE HYDROCHLORIDE 1 PATCH: 20 JELLY TOPICAL at 10:20

## 2025-05-02 RX ADMIN — DEXAMETHASONE 6 MILLIGRAM(S): 0.5 TABLET ORAL at 10:17

## 2025-05-02 RX ADMIN — Medication 100 MILLILITER(S): at 10:16

## 2025-05-02 RX ADMIN — Medication 400 MILLIGRAM(S): at 16:03

## 2025-05-02 RX ADMIN — CYCLOBENZAPRINE HYDROCHLORIDE 10 MILLIGRAM(S): 15 CAPSULE, EXTENDED RELEASE ORAL at 13:36

## 2025-05-02 NOTE — DIETITIAN NUTRITION RISK NOTIFICATION - TREATMENT: THE FOLLOWING DIET HAS BEEN RECOMMENDED
Diet, Regular:   DASH/TLC {Sodium & Cholesterol Restricted} (DASH) (05-01-25 @ 17:40) [Active]

## 2025-05-02 NOTE — PROGRESS NOTE ADULT - ASSESSMENT
HPI:  65 y/o female with PMHx of morbid obesity, HTN, HLD, severe FRANCHESKA (on CPAP), depression, chronic lower back pain in ED c/o worsening lower back pain x 4 days s/p slip and fall onto buttocks. pt states that she was helping her son move his bed when he pushed to bed too hard knocking her backwards onto her buttocks, got up immediately, denies LOC or head strike. States has been taking her pain meds that she take for her chronic knee pain with some relief.  Reports she was seen at  2 days ago and was told may have small compression fx on XR and given medrol dose pack with some improvement. Pt states today pain radiating down her RLE associated with numbness prompting ED visit. Pt otherwise denies any weakness, bowel or bladder incontinence, chest pain, SOB, f/c/n/v/d, abd pain, LE edema, dysuria, HA, dizziness.     In ED BP slightly elevated, rest of VSS and afebrile. CT lumbar spine with compression fracture deformity at L3, degenerative changes. Labs overall unremarkable. ED consulted ortho spine Dr. Reid who recommended MRI lumbar spine and admission to med/surg for further management.  (01 May 2025 14:02)    IMP:  S/P fall  Acute L3 VCF  Small epidural hematoma L3  Morbid obesity    PLAN:  Patient admitted to Medicine  Analgesia prn  Medical management  Options of treatment discussed with patient. There is no indication for emergent surgery due to the hematoma. Options include observation alone, brace if it can be fitted to her body habitus and possible kyphoplasty. Patient interested in kypho but I spoke to Dr. Steel and he would like to see how she does in a brace due to the kyphoplsty. May be candidate for kypho prior to D/C.

## 2025-05-02 NOTE — PROGRESS NOTE ADULT - SUBJECTIVE AND OBJECTIVE BOX
HPI:  63 y/o female with PMHx of morbid obesity, HTN, HLD, severe FRANCHESKA (on CPAP), depression, chronic lower back pain in ED c/o worsening lower back pain x 4 days s/p slip and fall onto buttocks. pt states that she was helping her son move his bed when he pushed to bed too hard knocking her backwards onto her buttocks, got up immediately, denies LOC or head strike. States has been taking her pain meds that she take for her chronic knee pain with some relief.  Reports she was seen at  2 days ago and was told may have small compression fx on XR and given medrol dose pack with some improvement. Pt states today pain radiating down her RLE associated with numbness prompting ED visit. Pt otherwise denies any weakness, bowel or bladder incontinence, chest pain, SOB, f/c/n/v/d, abd pain, LE edema, dysuria, HA, dizziness.     In ED BP slightly elevated, rest of VSS and afebrile. CT lumbar spine with compression fracture deformity at L3, degenerative changes. Labs overall unremarkable. ED consulted ortho spine Dr. Reid who recommended MRI lumbar spine and admission to med/surg for further management.  (01 May 2025 14:02)    5/2/25 R thigh paresthesia near her knee    PAST MEDICAL & SURGICAL HISTORY:  HTN (hypertension)      Obesity      Anxiety and depression      No pertinent past surgical history      MEDICATIONS  (STANDING):  acetaminophen     Tablet .. 1000 milliGRAM(s) Oral every 8 hours  buPROPion . 100 milliGRAM(s) Oral daily  escitalopram 20 milliGRAM(s) Oral daily  gabapentin 300 milliGRAM(s) Oral three times a day  lidocaine   4% Patch 1 Patch Transdermal daily  losartan 50 milliGRAM(s) Oral daily  naloxone Injectable 0.4 milliGRAM(s) IV Push once  oxybutynin 5 milliGRAM(s) Oral daily  pantoprazole    Tablet 40 milliGRAM(s) Oral before breakfast  rosuvastatin 20 milliGRAM(s) Oral at bedtime  senna 2 Tablet(s) Oral at bedtime  sodium chloride 0.9%. 1000 milliLiter(s) (100 mL/Hr) IV Continuous <Continuous>    MEDICATIONS  (PRN):  aluminum hydroxide/magnesium hydroxide/simethicone Suspension 30 milliLiter(s) Oral every 4 hours PRN Dyspepsia  bisacodyl 5 milliGRAM(s) Oral daily PRN Constipation  melatonin 3 milliGRAM(s) Oral at bedtime PRN Insomnia  morphine  - Injectable 4 milliGRAM(s) IV Push once PRN breakthrough pain not relieved by oxycodone 10mg  ondansetron Injectable 4 milliGRAM(s) IV Push every 8 hours PRN Nausea and/or Vomiting  oxyCODONE    IR 5 milliGRAM(s) Oral every 4 hours PRN Moderate Pain (4 - 6)  oxyCODONE    IR 10 milliGRAM(s) Oral every 4 hours PRN Severe Pain (7 - 10)  polyethylene glycol 3350 17 Gram(s) Oral daily PRN Constipation    Allergies    bee stings (Unknown)  amoxicillin (Unknown)  penicillin (Unknown)    Intolerances    WA:    Vital Signs Last 24 Hrs  T(C): 36.4 (02 May 2025 08:00), Max: 36.8 (01 May 2025 16:00)  T(F): 97.6 (02 May 2025 08:00), Max: 98.3 (01 May 2025 16:00)  HR: 64 (02 May 2025 08:00) (56 - 76)  BP: 144/67 (02 May 2025 08:00) (133/65 - 150/70)  BP(mean): --  RR: 18 (02 May 2025 08:00) (18 - 18)  SpO2: 95% (02 May 2025 08:00) (92% - 99%)    Parameters below as of 02 May 2025 08:00  Patient On (Oxygen Delivery Method): room air    Obese 65 yo WF laying supine with HOB at 40 degrees c/o R thigh paresthesia  Tneder LS spine  Negative SLR   No focal weakness LEs-able to maintain R SLR and good strength against resistance remaining groups    LABS                        13.5   6.97  )-----------( 267      ( 02 May 2025 04:00 )             42.4     05-02    140  |  110[H]  |  15  ----------------------------<  119[H]  3.6   |  24  |  0.72    Ca    9.2      02 May 2025 04:00    TPro  7.0  /  Alb  3.3  /  TBili  0.6  /  DBili  x   /  AST  18  /  ALT  25  /  AlkPhos  72  05-01    STUDIES  ACC: [id number] EXAM:  MR SPINE LUMBAR   ORDERED BY: SANDRA DALLAS     PROCEDURE DATE:  05/01/2025      INTERPRETATION:  CLINICAL INFORMATION: Fall. L3 fracture.    ADDITIONAL CLINICAL INFORMATION: Not Applicable    TECHNIQUE: Multiplanar, multisequence MRI was performed of the lumbar   spine.  IV Contrast:    PRIOR STUDIES: CT lumbar spine 5/1/2025.    FINDINGS:    LOCALIZER: No additional findings.  BONES: Acute fracture of the inferior L3 endplate with mild bony   retropulsion. Small epidural hematoma along the posterior aspect of the   L3 vertebral body. These findings result in mild spinal canal narrowing   at the L3 level.    Hemangiomas in the L1 and L2 vertebral bodies.    ALIGNMENT: Lumbar levoscoliosis.  SACROILIAC JOINTS/SACRUM: There is no sacral fracture. The SI joints are   partially visualized but are intact.  CONUS AND CAUDA EQUINA: The distal cord and conus are normal in signal.   Conus terminates at L1.  VISUALIZED INTRAPELVIC/INTRA-ABDOMINAL SOFT TISSUES: Normal.  PARASPINAL SOFT TISSUES: Normal.    INDIVIDUAL LEVELS:    LOWER THORACIC SPINE: No spinal canal or neuroforaminal stenosis.    L1-L2: Disc bulge. No significant spinal canal stenosis. Mild left   neuroforaminal stenosis. No right neuroforaminal stenosis.  L2-L3: No spinal canal or neuroforaminal stenosis.  L3-L4: Disc bulge. Mild spinal canal stenosis. Mild bilateral   neuroforaminal stenosis.  L4-L5: Disc bulge. No spinal canal stenosis. Mild bilateral   neuroforaminal stenosis.  L5-S1: No spinal canal or neuroforaminal stenosis.    IMPRESSION:  Acute fracture of the inferior L3 endplate with mild bony retropulsion.   Small epidural hematoma along the posterior aspect of the L3 vertebral   body. These findings result in mild spinal canal narrowing at the L3   level.    ACC: [id number] EXAM:  CT LUMBAR SPINE   ORDERED BY: DEISY PEREIRA     PROCEDURE DATE:  05/01/2025      INTERPRETATION:  EXAM: CT OF THE LUMBAR SPINE WITHOUT CONTRAST    HISTORY: back pain s/p fall    TECHNIQUE:  CT of the lumbar spine was performed without administration of   intravenous contrast. Sagittal and coronal reconstructions were   subsequently provided.    COMPARISON: None.    FINDINGS:    There is a lumbar lordosis. Normal alignment of the lumbar vertebrae.   Compression fracture deformity at L3 with approximate loss of height of   25%. Mild retropulsion of the inferior aspect of the L3 vertebral body   into the thecal sac. The remainder of the vertebral bodies have the   appropriate height. Mild reduced intervertebral disc height. No jumped or   perched facets.    T12-L1: No large disc bulge. The bilateral neuroforamina are patent. No   significant narrowing of the spinal canal.    L1-L2: Small disc bulge, eccentric to the left. Mild to moderate   narrowing of the left neuroforamen. The right neuroforamen is patent. No   significant narrowing of the spinal canal.    L2-L3: Subtle disc bulge. The bilateral neuroforamina are patent. No   significant narrowing of the spinal canal.    L3-L4: Mild retropulsion of the inferior aspect of the L3 vertebral body   into the thecal sac. Moderate to severe narrowing of the bilateral   neuroforamen, right greater than left. Mild thickening of ligamentum   flavum. Mild narrowing of the spinal canal. Mild facet arthropathy.    L4-L5: Small disc osteophyte complex. Mild to moderate narrowing of the   bilateral neuroforamen. Mild narrowing of the spinal canal. Facet   arthropathy.    L5-S1: Subtle disc bulge. Mild narrowing of the bilateral neuroforamen.   No significant narrowing of the spinal canal. Facet arthropathy.    The paraspinal muscles are unremarkable. Large hiatal hernia. 1.8 cm   hypodensity in the right lobe of the liver, image 52 of series 3 and 1.6   cm hypodensity in the left lobe of liver, image 32 of series3. Ovoid   soft tissue mass in the right adrenal gland measuring approximately 3.1 x   4.3 cm. Pelvic fullness to the bilateral kidneys, left greater than   right. Diverticulosis without fito evidence of diverticulitis in the   sigmoid colon.    IMPRESSION:    1.  Compression fracture deformity at L3. Recommend MRI of the lumbar   spine for further evaluation of acuity.  2.  Degenerative changes of the lumbar pine.  3.  Other abdominal findings as discussed above. Recommend dedicated   abdominal imaging for further evaluation.    ACC: [id number] EXAM:  CT ABDOMEN AND PELVIS IC   ORDERED BY: BAN EASTMAN     PROCEDURE DATE:  05/01/2025      INTERPRETATION:  CLINICAL INFORMATION: 64-year-old female with back pain   following fall. Abdominal findings on CT lumbar spine    COMPARISON: CT lumbar spine 05/01/2025. CT chest 11/03/2017    CONTRAST/COMPLICATIONS:  IV Contrast: Omnipaque 350  90 cc administered   0 cc discarded  Oral Contrast: NONE  .    PROCEDURE:  CT of the Abdomen and Pelvis was performed.  Sagittal and coronal reformats were performed.    FINDINGS:  LOWER CHEST: Within normal limits.    LIVER: Several subcentimeter hypodensities. 18 mm hypodensity segment 6   similar to 11/03/2017  BILE DUCTS: Normal caliber.  GALLBLADDER: Within normal limits.  SPLEEN: Within normal limits.  PANCREAS: Within normal limits.  ADRENALS: Right adrenal lesion 4.3 x 3 cm unchanged from 11/03/2017 at   which time it was consistent with a benign adenoma.  KIDNEYS/URETERS: Probable bilateral parapelvic cysts.    BLADDER: Within normal limits.  REPRODUCTIVE ORGANS: Uterus and adnexa within normal limits.    BOWEL: No bowel obstruction. Large sliding hiatus hernia. Colonic   diverticulosis. Appendix normal.  PERITONEUM/RETROPERITONEUM: Within normal limits.  VESSELS: Within normal limits.  LYMPH NODES: No lymphadenopathy.  ABDOMINAL WALL: Fat-containing umbilical hernia.  BONES: Compression deformity L3. Degenerative change. Please refer to   report of CT lumbar spine of the same date.    IMPRESSION:  No significant intra-abdominal finding.

## 2025-05-02 NOTE — PHYSICAL THERAPY INITIAL EVALUATION ADULT - ADDITIONAL COMMENTS
(+) , [facility]. Patient reported h/o L knee pain last year.  "I need a knee replacement."

## 2025-05-02 NOTE — DIETITIAN INITIAL EVALUATION ADULT - OTHER INFO
65 y/o female with PMHx of morbid obesity, HTN, HLD, severe FRANCHESKA (on CPAP), depression, chronic lower back pain in ED c/o worsening lower back pain x 4 days s/p slip and fall onto buttocks. pt states that she was helping her son move his bed when he pushed to bed too hard knocking her backwards onto her buttocks, got up immediately, denies LOC or head strike. States has been taking her pain meds that she take for her chronic knee pain with some relief.  Reports she was seen at  2 days ago and was told may have small compression fx on XR and given medrol dose pack with some improvement. Pt states today pain radiating down her RLE associated with numbness prompting ED visit. CT lumbar spine with compression fracture deformity at L3, degenerative changes. Labs overall unremarkable. ED consulted ortho spine Dr. Reid who recommended MRI lumbar spine and admission to med/surg for further management.     Visited pt at bed side this morning. Currently NPO, pending MRI of lumbar spine w/ ortho. Reports poor appetite currently d/t significant pain. States wt prior to starting Zepound x 3 mo ago at 322#, endorses ~17# wt loss since, stating current wt at 305# however suspects wt may be less. Bed scale wt of 293# taken by RD on 5/2/25, mild edema doc'd which may be skewing current wt appearance (pt reports h/o chronic fluid retention). Total wt loss of 29#/ 9.0% wt loss x 3 mo. NFPE reveals only moderate orbital wasting at this time. RD discussed the importance of consuming protein-rich foods vs snacks rich in carbohydrates while taking wt loss medication in order to promote satiety and maintain muscle mass which pt was receptive to; reports she scheduled appt w/ outpatient RD for the end of the month. During admission, will trial Premier protein shake daily (provides 160 kcal, 30 g protein/ shake) Can c/w current therapeutic diet. See other recs below.

## 2025-05-02 NOTE — DIETITIAN INITIAL EVALUATION ADULT - PERTINENT MEDS FT
MEDICATIONS  (STANDING):  acetaminophen     Tablet .. 1000 milliGRAM(s) Oral every 8 hours  buPROPion . 100 milliGRAM(s) Oral daily  dexAMETHasone  Injectable 6 milliGRAM(s) IV Push daily  escitalopram 20 milliGRAM(s) Oral daily  gabapentin 300 milliGRAM(s) Oral three times a day  lidocaine   4% Patch 1 Patch Transdermal daily  losartan 50 milliGRAM(s) Oral daily  naloxone Injectable 0.4 milliGRAM(s) IV Push once  oxybutynin 5 milliGRAM(s) Oral daily  pantoprazole    Tablet 40 milliGRAM(s) Oral before breakfast  rosuvastatin 20 milliGRAM(s) Oral at bedtime  senna 2 Tablet(s) Oral at bedtime  sodium chloride 0.9%. 1000 milliLiter(s) (100 mL/Hr) IV Continuous <Continuous>    MEDICATIONS  (PRN):  aluminum hydroxide/magnesium hydroxide/simethicone Suspension 30 milliLiter(s) Oral every 4 hours PRN Dyspepsia  bisacodyl 5 milliGRAM(s) Oral daily PRN Constipation  melatonin 3 milliGRAM(s) Oral at bedtime PRN Insomnia  morphine  - Injectable 4 milliGRAM(s) IV Push once PRN breakthrough pain not relieved by oxycodone 10mg  ondansetron Injectable 4 milliGRAM(s) IV Push every 8 hours PRN Nausea and/or Vomiting  oxyCODONE    IR 5 milliGRAM(s) Oral every 4 hours PRN Moderate Pain (4 - 6)  oxyCODONE    IR 10 milliGRAM(s) Oral every 4 hours PRN Severe Pain (7 - 10)  polyethylene glycol 3350 17 Gram(s) Oral daily PRN Constipation

## 2025-05-02 NOTE — DIETITIAN INITIAL EVALUATION ADULT - PERTINENT LABORATORY DATA
05-02    140  |  110[H]  |  15  ----------------------------<  119[H]  3.6   |  24  |  0.72    Ca    9.2      02 May 2025 04:00    TPro  7.0  /  Alb  3.3  /  TBili  0.6  /  DBili  x   /  AST  18  /  ALT  25  /  AlkPhos  72  05-01

## 2025-05-02 NOTE — DIETITIAN INITIAL EVALUATION ADULT - NUTRITIONGOAL OUTCOME1
Pt will consume >/= 80% of all protein-rich meals and supplement and f/u w/ outpatient RD upon d/c for further diet education

## 2025-05-02 NOTE — PHYSICAL THERAPY INITIAL EVALUATION ADULT - GENERAL OBSERVATIONS, REHAB EVAL
Patient received in bed on 2N, +IV, disconnected for ambulation by RN. Orthotist in during session for LSO fitting.  Patient c/o LBP radiating down R LE to the knee at 2/10 at rest and 10/10 during ambulation with RW and LSO.

## 2025-05-02 NOTE — PHYSICAL THERAPY INITIAL EVALUATION ADULT - MODALITIES TREATMENT COMMENTS
Patient returned to bed by request, call bell in reach and bed alarm active. Pain decreased once supine with legs up back to 2/10. GALE, RN informed of session/status.

## 2025-05-02 NOTE — DIETITIAN INITIAL EVALUATION ADULT - ORAL INTAKE PTA/DIET HISTORY
Diet recall obtained: B- belvita biscuit (1 pkt); L- sometimes skips, if she does eat lunch will consist of either cold cereal, yogurt or cottage cheese w/ fruit and granola; D- salad, rotisserie chix; reports following low salt diet at home. Lives w/ son; states sometimes he will grocery shop or she orders groceries online and goes to store for curbside pick-up. Taking Zepbound x 3 months (last dose 4/28) for wt loss, pt reports appetite has decreased since taking medication. Based off diet recall, meeting <50% ENN.

## 2025-05-02 NOTE — PROGRESS NOTE ADULT - SUBJECTIVE AND OBJECTIVE BOX
Patient 64 Y female evaluated measured  fitted for rigid LSO  spinal orthosis to immobilize post L3 fracture. Patient XXL in size needs   custom fitting to accommodate patient model for precise fit and function  immobilize lumbar spine. LSO with control lumbar sacra; spine  in all planes reduce load stress to injury allow weight bearing and walking  aid in treatment  healing  and recovery ordered by Orthopedics measured fitted to   patient anatomy model by Orthotist from [facility]. [phone]

## 2025-05-02 NOTE — PHYSICAL THERAPY INITIAL EVALUATION ADULT - DISCHARGE PLANNER MADE AWARE
Concern for delay in diagnosis and treatment/Concern for neurologic deterioration/Concern for renal deterioration/Concern for worsening infectious process/Concern for cardiopulmonary deterioration yes

## 2025-05-02 NOTE — DIETITIAN INITIAL EVALUATION ADULT - NSFNSGIIOFT_GEN_A_CORE
I&O's Detail    01 May 2025 07:01  -  02 May 2025 07:00  --------------------------------------------------------  IN:    Oral Fluid: 200 mL    sodium chloride 0.9%: 800 mL  Total IN: 1000 mL    OUT:  Total OUT: 0 mL    Total NET: 1000 mL

## 2025-05-02 NOTE — PROGRESS NOTE ADULT - ASSESSMENT
64 year old woman admitted s/p fall now with Acute fracture of the inferior L3 endplate.    *s/p fall now with acute fracture of the L3 endplate  *small epidural hematoma L3-  no indication for emergent surgery due to the hematoma.   - admitted under medicine  - ORtho spine consulted possible kyphoplasty   - CT lumbar spine with compression fracture deformity at L3, degenerative changes  - Pain meds PRN  - IV steroids  - encourage IS  - will need LSO brace when OOB   - PT      #Abnormal CTA   - CT lumbar spine with multiple incidental abdominal/pelvic findings   - Check CT abd/pelvis with contrast - no significant findings     #HTN/HLD  - Continue losartan/statin, BP likely exacerbated due to pain     #Severe FRANCHESKA, morbid obesity  - Continue CPAP qHS  - Nutrition consult  - Takes Zepbound (Tirzepatide) once a week for weight loss, last dose on Monday 4/28    #Depression/anxiety  - continue wellbutrin, lexapro     #GERD  - PPI#DVT ppx  - heparin SQ    Case d/w team on IDR.

## 2025-05-02 NOTE — PROGRESS NOTE ADULT - SUBJECTIVE AND OBJECTIVE BOX
65 y/o woman with morbid obesity, HTN, HLD, severe FRANCHESKA (on CPAP), depression, chronic lower back pain in ED c/o worsening lower back pain x 4 days - s/p slip and fall onto buttocks. AS per patient  she was helping her son move his bed when he pushed to bed too hard knocking her backwards onto her buttocks, got up immediately, denies LOC or head strike. States has been taking her pain meds that she take for her chronic knee pain with some relief.  Reports she was seen at  2 days ago and was told may have small compression fx on XR and given medrol dose pack with some improvement.   CT lumbar spine with compression fracture deformity at L3, degenerative changes. Labs overall unremarkable. Ortho consulted.    5/2- patient was seen and examined. VSS- complaining of right leg pain, stated that she has pain when she ambulates. Denies CP or SOB.    REVIEW OF SYSTEMS:    CONSTITUTIONAL: No weakness, fevers or chills  EYES/ENT: No visual changes;  No vertigo or throat pain   NECK: No pain or stiffness  RESPIRATORY: No cough, wheezing, hemoptysis; No shortness of breath  CARDIOVASCULAR: No chest pain or palpitations  GASTROINTESTINAL: No abdominal or epigastric pain. No nausea, vomiting, or hematemesis; No diarrhea or constipation. No melena or hematochezia.  GENITOURINARY: No dysuria, frequency or hematuria  NEUROLOGICAL: No numbness or weakness  SKIN: No itching, rashes    Vital Signs Last 24 Hrs  T(C): 36.4 (02 May 2025 08:00), Max: 36.8 (01 May 2025 16:00)  T(F): 97.6 (02 May 2025 08:00), Max: 98.3 (01 May 2025 16:00)  HR: 64 (02 May 2025 08:00) (56 - 76)  BP: 144/67 (02 May 2025 08:00) (133/65 - 144/67)  BP(mean): --  RR: 18 (02 May 2025 08:00) (18 - 18)  SpO2: 95% (02 May 2025 08:00) (92% - 97%)    Parameters below as of 02 May 2025 08:00  Patient On (Oxygen Delivery Method): room air    PE:  Constitutional: NAD  HEENT: NC/AT  Back: no tenderness  Respiratory: respirations even and non labored, LCTA  Cardiovascular: S1S2 regular, no murmurs  Abdomen: soft, not tender, not distended, positive BS  Genitourinary: voiding  Musculoskeletal: no muscle tenderness, no joint swelling or tenderness  Extremities: no pedal edema   Neurological: no focal deficits     MEDICATIONS  (STANDING):  acetaminophen     Tablet .. 1000 milliGRAM(s) Oral every 8 hours  buPROPion . 100 milliGRAM(s) Oral daily  escitalopram 20 milliGRAM(s) Oral daily  gabapentin 400 milliGRAM(s) Oral three times a day  lidocaine   4% Patch 1 Patch Transdermal daily  losartan 50 milliGRAM(s) Oral daily  naloxone Injectable 0.4 milliGRAM(s) IV Push once  oxybutynin 5 milliGRAM(s) Oral daily  pantoprazole    Tablet 40 milliGRAM(s) Oral before breakfast  rosuvastatin 20 milliGRAM(s) Oral at bedtime  senna 2 Tablet(s) Oral at bedtime  sodium chloride 0.9%. 1000 milliLiter(s) (100 mL/Hr) IV Continuous <Continuous>    MEDICATIONS  (PRN):  aluminum hydroxide/magnesium hydroxide/simethicone Suspension 30 milliLiter(s) Oral every 4 hours PRN Dyspepsia  bisacodyl 5 milliGRAM(s) Oral daily PRN Constipation  cyclobenzaprine 10 milliGRAM(s) Oral three times a day PRN Muscle Spasm  melatonin 3 milliGRAM(s) Oral at bedtime PRN Insomnia  morphine  - Injectable 4 milliGRAM(s) IV Push once PRN breakthrough pain not relieved by oxycodone 10mg  ondansetron Injectable 4 milliGRAM(s) IV Push every 8 hours PRN Nausea and/or Vomiting  oxyCODONE    IR 5 milliGRAM(s) Oral every 4 hours PRN Moderate Pain (4 - 6)  oxyCODONE    IR 10 milliGRAM(s) Oral every 4 hours PRN Severe Pain (7 - 10)  polyethylene glycol 3350 17 Gram(s) Oral daily PRN Constipation      05-02    140  |  110[H]  |  15  ----------------------------<  119[H]  3.6   |  24  |  0.72    Ca    9.2      02 May 2025 04:00    TPro  7.0  /  Alb  3.3  /  TBili  0.6  /  DBili  x   /  AST  18  /  ALT  25  /  AlkPhos  72  05-01                            13.5   6.97  )-----------( 267      ( 02 May 2025 04:00 )             42.4       ACC: [id number] EXAM:  MR SPINE LUMBAR   ORDERED BY: SANDRA DALLAS     PROCEDURE DATE:  05/01/2025          INTERPRETATION:  CLINICAL INFORMATION: Fall. L3 fracture.    ADDITIONAL CLINICAL INFORMATION: Not Applicable    TECHNIQUE: Multiplanar, multisequence MRI was performed of the lumbar   spine.  IV Contrast:    PRIOR STUDIES: CT lumbar spine 5/1/2025.    FINDINGS:    LOCALIZER: No additional findings.  BONES: Acute fracture of the inferior L3 endplate with mild bony   retropulsion. Small epidural hematoma along the posterior aspect of the   L3 vertebral body. These findings result in mild spinal canal narrowing   at the L3 level.    Hemangiomas in the L1 and L2 vertebral bodies.    ALIGNMENT: Lumbar levoscoliosis.  SACROILIAC JOINTS/SACRUM: There is no sacral fracture. The SI joints are   partially visualized but are intact.  CONUS AND CAUDA EQUINA: The distal cord and conus are normal in signal.   Conus terminates at L1.  VISUALIZED INTRAPELVIC/INTRA-ABDOMINAL SOFT TISSUES: Normal.  PARASPINAL SOFT TISSUES: Normal.      INDIVIDUAL LEVELS:    LOWER THORACIC SPINE: No spinal canal or neuroforaminal stenosis.    L1-L2: Disc bulge. No significant spinal canal stenosis. Mild left   neuroforaminal stenosis. No right neuroforaminal stenosis.  L2-L3: No spinal canal or neuroforaminal stenosis.  L3-L4: Disc bulge. Mild spinal canal stenosis. Mild bilateral   neuroforaminal stenosis.  L4-L5: Disc bulge. No spinal canal stenosis. Mild bilateral   neuroforaminal stenosis.  L5-S1: No spinal canal or neuroforaminal stenosis.      IMPRESSION:  Acute fracture of the inferior L3 endplate with mild bony retropulsion.   Small epidural hematoma along the posterior aspect of the L3 vertebral   body. These findings result in mild spinal canal narrowing at the L3   level.

## 2025-05-02 NOTE — DIETITIAN INITIAL EVALUATION ADULT - ADD RECOMMEND
1) C/w current therapeutic diet. Encourage consumption of protein-rich foods  2) Will add Premier protein shake daily (provides 160 kcal, 30 g protein/ shake)   3) Monitor bowel movements, c/w current bowel regimens.  4) MVI w/ minerals daily to ensure 100% RDA met  5) Obtain weekly wt to track/trend changes  6) Consider to obtain vitamin D 25OH level to assess nutriture   7) F/u w/ outpatient RD for further education  RD will continue to monitor PO intake, labs, hydration, and wt prn.

## 2025-05-02 NOTE — PHYSICAL THERAPY INITIAL EVALUATION ADULT - ACTIVE RANGE OF MOTION EXAMINATION, REHAB EVAL
patient c/o pain in LS with hip ROM B/bilateral upper extremity Active ROM was WFL (within functional limits)/bilateral  lower extremity Active ROM was WFL (within functional limits)

## 2025-05-02 NOTE — PHYSICAL THERAPY INITIAL EVALUATION ADULT - PERTINENT HX OF CURRENT PROBLEM, REHAB EVAL
63 y/o female with PMHx of morbid obesity, HTN, HLD, severe FRANCHESKA (on CPAP), depression, chronic lower back pain in ED c/o worsening lower back pain x 4 days s/p slip and fall onto buttocks. pt states that she was helping her son move his bed when he pushed to bed too hard knocking her backwards onto her buttocks, got up immediately, denies LOC or head strike. States has been taking her pain meds that she take for her chronic knee pain with some relief.  Reports she was seen at  2 days ago and was told may have small compression fx on XR and given medrol dose pack with some improvement. Pt states today pain radiating down her RLE associated with numbness prompting ED visit. CT lumbar spine with compression fracture deformity at L3.  MRI: Acute fracture of the inferior L3 endplate with mild bony retropulsion. Small epidural hematoma along the posterior aspect of the L3 vertebral body. These findings result in mild spinal canal narrowing at the L3 level.

## 2025-05-03 PROCEDURE — 99233 SBSQ HOSP IP/OBS HIGH 50: CPT

## 2025-05-03 RX ORDER — HYDROMORPHONE/SOD CHLOR,ISO/PF 2 MG/10 ML
1 SYRINGE (ML) INJECTION EVERY 4 HOURS
Refills: 0 | Status: DISCONTINUED | OUTPATIENT
Start: 2025-05-03 | End: 2025-05-06

## 2025-05-03 RX ORDER — ENOXAPARIN SODIUM 100 MG/ML
40 INJECTION SUBCUTANEOUS EVERY 12 HOURS
Refills: 0 | Status: DISCONTINUED | OUTPATIENT
Start: 2025-05-03 | End: 2025-05-06

## 2025-05-03 RX ORDER — DEXAMETHASONE 0.5 MG/1
4 TABLET ORAL EVERY 6 HOURS
Refills: 0 | Status: DISCONTINUED | OUTPATIENT
Start: 2025-05-03 | End: 2025-05-04

## 2025-05-03 RX ADMIN — Medication 1000 MILLIGRAM(S): at 13:02

## 2025-05-03 RX ADMIN — BUPROPION HYDROBROMIDE 100 MILLIGRAM(S): 522 TABLET, EXTENDED RELEASE ORAL at 10:09

## 2025-05-03 RX ADMIN — Medication 400 MILLIGRAM(S): at 10:09

## 2025-05-03 RX ADMIN — OXYCODONE HYDROCHLORIDE 10 MILLIGRAM(S): 30 TABLET ORAL at 12:06

## 2025-05-03 RX ADMIN — Medication 1000 MILLIGRAM(S): at 21:51

## 2025-05-03 RX ADMIN — ESCITALOPRAM OXALATE 20 MILLIGRAM(S): 20 TABLET ORAL at 10:09

## 2025-05-03 RX ADMIN — LIDOCAINE HYDROCHLORIDE 1 PATCH: 20 JELLY TOPICAL at 13:02

## 2025-05-03 RX ADMIN — Medication 500 MILLIGRAM(S): at 10:09

## 2025-05-03 RX ADMIN — DEXAMETHASONE 4 MILLIGRAM(S): 0.5 TABLET ORAL at 17:07

## 2025-05-03 RX ADMIN — ENOXAPARIN SODIUM 40 MILLIGRAM(S): 100 INJECTION SUBCUTANEOUS at 21:51

## 2025-05-03 RX ADMIN — Medication 1000 MILLIGRAM(S): at 06:14

## 2025-05-03 RX ADMIN — GABAPENTIN 400 MILLIGRAM(S): 400 CAPSULE ORAL at 21:51

## 2025-05-03 RX ADMIN — ROSUVASTATIN CALCIUM 20 MILLIGRAM(S): 20 TABLET, FILM COATED ORAL at 21:52

## 2025-05-03 RX ADMIN — CYCLOBENZAPRINE HYDROCHLORIDE 10 MILLIGRAM(S): 15 CAPSULE, EXTENDED RELEASE ORAL at 21:52

## 2025-05-03 RX ADMIN — Medication 40 MILLIGRAM(S): at 06:15

## 2025-05-03 RX ADMIN — Medication 2 TABLET(S): at 21:51

## 2025-05-03 RX ADMIN — LIDOCAINE HYDROCHLORIDE 1 PATCH: 20 JELLY TOPICAL at 19:45

## 2025-05-03 RX ADMIN — GABAPENTIN 400 MILLIGRAM(S): 400 CAPSULE ORAL at 06:15

## 2025-05-03 RX ADMIN — OXYBUTYNIN CHLORIDE 5 MILLIGRAM(S): 5 TABLET, FILM COATED, EXTENDED RELEASE ORAL at 10:09

## 2025-05-03 RX ADMIN — GABAPENTIN 400 MILLIGRAM(S): 400 CAPSULE ORAL at 13:02

## 2025-05-03 RX ADMIN — OXYCODONE HYDROCHLORIDE 10 MILLIGRAM(S): 30 TABLET ORAL at 12:50

## 2025-05-03 RX ADMIN — LOSARTAN POTASSIUM 50 MILLIGRAM(S): 100 TABLET, FILM COATED ORAL at 10:09

## 2025-05-03 NOTE — PROGRESS NOTE ADULT - ASSESSMENT
64 F with Hx of morbid obesity, HTN, HLD, severe FRANCHESKA (on CPAP), depression, chronic lower back pain, s/p slip and fall onto buttocks about 4 days prior to admission, now admitted with intractable back pain and difficulty walking due to acute L3 vertebral compression fracture.    S/P Mechanical Fall with Acute Back Pain due to Acute L3 Vertebral Compression Fracture complicated by small Epidural Hematoma  -  pain control  -  appreciate ortho spine consult -> spoke with Dr. Steel, no plans for kyphoplasty at this time  -  muscle relaxants PRN  -  PT/OT as tolerated  -  fall precautions  -  neurochecks  -  TLSO brace  -  check Vitamin D level  -  H/H stable    HTN  -  stable, continue Losartan    Hyperlipidemia  -  continue statin    Leukocytosis  -  resolved  -  afebrile    Severe FRANCHESKA / Morbid Obesity / BMI 48  -  continue CPAP qHS  -  takes Zepbound once a week for weight loss, last dose on Monday 4/28    Depression / Anxiety  -  continue Wellbutrin and Lexapro    GERD  -  continue PPIDVT prophylaxis  -  venodynes and Lovenox q12 hours    d/w patient and RN   64 F with Hx of morbid obesity, HTN, HLD, severe FRANCHESKA (on CPAP), depression, chronic lower back pain, s/p slip and fall onto buttocks about 4 days prior to admission, now admitted with intractable back pain and difficulty walking due to acute L3 vertebral compression fracture.    S/P Mechanical Fall with Acute Back Pain due to Acute L3 Vertebral Compression Fracture complicated by small Epidural Hematoma  -  pain control  -  appreciate ortho spine consult -> spoke with Dr. Steel, no plans for kyphoplasty at this time, but may plan for possible spine surgery early next week if does not improve with steroids  -  trial of IV steroids  -  muscle relaxants PRN  -  PT/OT as tolerated  -  fall precautions  -  neurochecks  -  TLSO brace  -  check Vitamin D level  -  H/H stable    HTN  -  stable, continue Losartan    Hyperlipidemia  -  continue statin    Leukocytosis  -  resolved  -  afebrile    Severe FRANCHESKA / Morbid Obesity / BMI 48  -  continue CPAP qHS  -  takes Zepbound once a week for weight loss, last dose on Monday 4/28    Depression / Anxiety  -  continue Wellbutrin and Lexapro    GERD  -  continue PPIDVT prophylaxis  -  venodynes and Lovenox q12 hours    d/w patient and RN

## 2025-05-03 NOTE — PROGRESS NOTE ADULT - SUBJECTIVE AND OBJECTIVE BOX
Chart and meds reviewed.  Patient seen and examined.    5/3 -     All other systems reviewed and found to be negative with the exception of what has been described above.    MEDICATIONS  (STANDING):  acetaminophen     Tablet .. 1000 milliGRAM(s) Oral every 8 hours  ascorbic acid 500 milliGRAM(s) Oral daily  buPROPion . 100 milliGRAM(s) Oral daily  escitalopram 20 milliGRAM(s) Oral daily  gabapentin 400 milliGRAM(s) Oral three times a day  lidocaine   4% Patch 1 Patch Transdermal daily  losartan 50 milliGRAM(s) Oral daily  magnesium oxide 400 milliGRAM(s) Oral daily  naloxone Injectable 0.4 milliGRAM(s) IV Push once  oxybutynin 5 milliGRAM(s) Oral daily  pantoprazole    Tablet 40 milliGRAM(s) Oral before breakfast  rosuvastatin 20 milliGRAM(s) Oral at bedtime  senna 2 Tablet(s) Oral at bedtime  sodium chloride 0.9%. 1000 milliLiter(s) (100 mL/Hr) IV Continuous <Continuous>    MEDICATIONS  (PRN):  aluminum hydroxide/magnesium hydroxide/simethicone Suspension 30 milliLiter(s) Oral every 4 hours PRN Dyspepsia  bisacodyl 5 milliGRAM(s) Oral daily PRN Constipation  cyclobenzaprine 10 milliGRAM(s) Oral three times a day PRN Muscle Spasm  melatonin 3 milliGRAM(s) Oral at bedtime PRN Insomnia  morphine  - Injectable 4 milliGRAM(s) IV Push once PRN breakthrough pain not relieved by oxycodone 10mg  ondansetron Injectable 4 milliGRAM(s) IV Push every 8 hours PRN Nausea and/or Vomiting  oxyCODONE    IR 5 milliGRAM(s) Oral every 4 hours PRN Moderate Pain (4 - 6)  oxyCODONE    IR 10 milliGRAM(s) Oral every 4 hours PRN Severe Pain (7 - 10)  polyethylene glycol 3350 17 Gram(s) Oral daily PRN Constipation    VITAL SIGNS:  T(F): 97.2 (05-03-25 @ 07:39), Max: 98.2 (05-02-25 @ 16:00)  HR: 56 (05-03-25 @ 07:39) (54 - 64)  BP: 123/69 (05-03-25 @ 07:39) (105/84 - 132/86)  RR: 18 (05-03-25 @ 07:39) (18 - 18)  SpO2: 100% (05-03-25 @ 07:39) (96% - 100%)    I&O's Summary    02 May 2025 07:01  -  03 May 2025 07:00  --------------------------------------------------------  IN: 2120 mL / OUT: 0 mL / NET: 2120 mL    PHYSICAL EXAM:  HEENT:  pupils equal and reactive, EOMI, no oropharyngeal lesions, erythema, exudates, oral thrush  NECK:   supple, no carotid bruits  CV:  +S1, +S2, regular, no murmurs  RESP:   lungs clear to auscultation bilaterally, no wheezing, rales, rhonchi, good air entry bilaterally  GI:  abdomen soft, non-tender, non-distended, normal BS  EXT:  no clubbing, no cyanosis, no edema, no calf pain, swelling or erythema  NEURO:  AAOX3, no focal neurological deficits, follows all commands, able to move extremities spontaneously  SKIN:  no ulcers, lesions or rashes    LABS:  No new labs today   Chart and meds reviewed.  Patient seen and examined.    5/3 - no events overnight, complains of back pain and pain now radiating down the anterior thigh, + voiding, no paresthesias, no nausea or vomiting or abdominal pain.      All other systems reviewed and found to be negative with the exception of what has been described above.    MEDICATIONS  (STANDING):  acetaminophen     Tablet .. 1000 milliGRAM(s) Oral every 8 hours  ascorbic acid 500 milliGRAM(s) Oral daily  buPROPion . 100 milliGRAM(s) Oral daily  escitalopram 20 milliGRAM(s) Oral daily  gabapentin 400 milliGRAM(s) Oral three times a day  lidocaine   4% Patch 1 Patch Transdermal daily  losartan 50 milliGRAM(s) Oral daily  magnesium oxide 400 milliGRAM(s) Oral daily  naloxone Injectable 0.4 milliGRAM(s) IV Push once  oxybutynin 5 milliGRAM(s) Oral daily  pantoprazole    Tablet 40 milliGRAM(s) Oral before breakfast  rosuvastatin 20 milliGRAM(s) Oral at bedtime  senna 2 Tablet(s) Oral at bedtime  sodium chloride 0.9%. 1000 milliLiter(s) (100 mL/Hr) IV Continuous <Continuous>    MEDICATIONS  (PRN):  aluminum hydroxide/magnesium hydroxide/simethicone Suspension 30 milliLiter(s) Oral every 4 hours PRN Dyspepsia  bisacodyl 5 milliGRAM(s) Oral daily PRN Constipation  cyclobenzaprine 10 milliGRAM(s) Oral three times a day PRN Muscle Spasm  melatonin 3 milliGRAM(s) Oral at bedtime PRN Insomnia  morphine  - Injectable 4 milliGRAM(s) IV Push once PRN breakthrough pain not relieved by oxycodone 10mg  ondansetron Injectable 4 milliGRAM(s) IV Push every 8 hours PRN Nausea and/or Vomiting  oxyCODONE    IR 5 milliGRAM(s) Oral every 4 hours PRN Moderate Pain (4 - 6)  oxyCODONE    IR 10 milliGRAM(s) Oral every 4 hours PRN Severe Pain (7 - 10)  polyethylene glycol 3350 17 Gram(s) Oral daily PRN Constipation    VITAL SIGNS:  T(F): 97.2 (05-03-25 @ 07:39), Max: 98.2 (05-02-25 @ 16:00)  HR: 56 (05-03-25 @ 07:39) (54 - 64)  BP: 123/69 (05-03-25 @ 07:39) (105/84 - 132/86)  RR: 18 (05-03-25 @ 07:39) (18 - 18)  SpO2: 100% (05-03-25 @ 07:39) (96% - 100%)    I&O's Summary    02 May 2025 07:01  -  03 May 2025 07:00  --------------------------------------------------------  IN: 2120 mL / OUT: 0 mL / NET: 2120 mL    PHYSICAL EXAM:  HEENT:  pupils equal and reactive, EOMI, no oropharyngeal lesions, erythema, exudates, oral thrush  NECK:   supple, no carotid bruits  CV:  +S1, +S2, regular, no murmurs  RESP:   lungs clear to auscultation bilaterally, no wheezing, rales, rhonchi, good air entry bilaterally  GI:  abdomen soft, non-tender, non-distended, normal BS  EXT:  no LE edema  NEURO:  AAOX3, no focal neurological deficits, follows all commands, able to move extremities spontaneously  SKIN:  no ulcers, lesions or rashes    LABS:  No new labs today

## 2025-05-03 NOTE — PROGRESS NOTE ADULT - SUBJECTIVE AND OBJECTIVE BOX
Chief Complaint: Back pain, right>leg leg pain    History: Patient fell sustaining L3 burst fracture. She continues to have severe back and leg pain. Her leg pain appears to be the most problematic at this time. She is having a difficult time standing and ambulating due to severe back and leg pain    OBJECTIVE:     Vital Signs Last 24 Hrs  T(C): 36.2 (03 May 2025 07:39), Max: 36.8 (02 May 2025 16:00)  T(F): 97.2 (03 May 2025 07:39), Max: 98.2 (02 May 2025 16:00)  HR: 56 (03 May 2025 07:39) (54 - 64)  BP: 123/69 (03 May 2025 07:39) (105/84 - 132/86)  BP(mean): --  RR: 18 (03 May 2025 07:39) (18 - 18)  SpO2: 100% (03 May 2025 07:39) (96% - 100%)    Parameters below as of 03 May 2025 07:39  Patient On (Oxygen Delivery Method): room air        Physical Exam:         Awake and alert         HEENT - unremarkable         Neck - supple         Back: Tenderness Ls region         Bilateral Upper Extremities:             Good Motor Strength             Sensation intact         Bilateral Lower Extremities             Good Motor Strength             Sensation intact             I&O's Detail    02 May 2025 07:01  -  03 May 2025 07:00  --------------------------------------------------------  IN:    Oral Fluid: 1620 mL    sodium chloride 0.9%: 500 mL  Total IN: 2120 mL    OUT:  Total OUT: 0 mL    Total NET: 2120 mL          LABS:                        13.5   6.97  )-----------( 267      ( 02 May 2025 04:00 )             42.4     05-02    140  |  110[H]  |  15  ----------------------------<  119[H]  3.6   |  24  |  0.72    Ca    9.2      02 May 2025 04:00      PT/INR - ( 02 May 2025 04:00 )   PT: 13.0 sec;   INR: 1.10 ratio         PTT - ( 02 May 2025 04:00 )  PTT:32.8 sec      RADIOLOGY & ADDITIONAL STUDIES: CT Ls spine - demonstrates L3 burst fx with bone retropulsion....MRI LS Spine demonstrates L3 fracture with bone retropulsion and small epidural hematoma.      A/P :  64y Female with L3 burst fracture  -    Extended period of time was spent with the patient discussing her condition and treatment options. Continued conservative treatment including brace management was discussed as was surgery. Since she has a significant amount of leg pain with bone retropulsion and epidural hematoma, kyphoplasty would not be recommended. If patient requires surgery it would include laminectomy and fusion in order to decompress her nerves and stabilize her spine. We'll see how she does in the next day or so with her brace. Will also start her on IV decadron. If her symptoms continue which are interfering with her getting OOB and mobilizing would consider surgery.  -    Continue present treatment

## 2025-05-04 PROCEDURE — 99232 SBSQ HOSP IP/OBS MODERATE 35: CPT

## 2025-05-04 RX ORDER — DEXAMETHASONE 0.5 MG/1
4 TABLET ORAL EVERY 8 HOURS
Refills: 0 | Status: DISCONTINUED | OUTPATIENT
Start: 2025-05-04 | End: 2025-05-05

## 2025-05-04 RX ADMIN — LIDOCAINE HYDROCHLORIDE 1 PATCH: 20 JELLY TOPICAL at 19:30

## 2025-05-04 RX ADMIN — ROSUVASTATIN CALCIUM 20 MILLIGRAM(S): 20 TABLET, FILM COATED ORAL at 22:34

## 2025-05-04 RX ADMIN — LIDOCAINE HYDROCHLORIDE 1 PATCH: 20 JELLY TOPICAL at 12:38

## 2025-05-04 RX ADMIN — OXYBUTYNIN CHLORIDE 5 MILLIGRAM(S): 5 TABLET, FILM COATED, EXTENDED RELEASE ORAL at 09:13

## 2025-05-04 RX ADMIN — Medication 1000 MILLIGRAM(S): at 22:34

## 2025-05-04 RX ADMIN — BUPROPION HYDROBROMIDE 100 MILLIGRAM(S): 522 TABLET, EXTENDED RELEASE ORAL at 09:14

## 2025-05-04 RX ADMIN — Medication 1000 MILLIGRAM(S): at 13:16

## 2025-05-04 RX ADMIN — DEXAMETHASONE 4 MILLIGRAM(S): 0.5 TABLET ORAL at 22:34

## 2025-05-04 RX ADMIN — GABAPENTIN 400 MILLIGRAM(S): 400 CAPSULE ORAL at 13:05

## 2025-05-04 RX ADMIN — ESCITALOPRAM OXALATE 20 MILLIGRAM(S): 20 TABLET ORAL at 09:20

## 2025-05-04 RX ADMIN — Medication 500 MILLIGRAM(S): at 09:13

## 2025-05-04 RX ADMIN — DEXAMETHASONE 4 MILLIGRAM(S): 0.5 TABLET ORAL at 00:04

## 2025-05-04 RX ADMIN — ENOXAPARIN SODIUM 40 MILLIGRAM(S): 100 INJECTION SUBCUTANEOUS at 09:13

## 2025-05-04 RX ADMIN — LOSARTAN POTASSIUM 50 MILLIGRAM(S): 100 TABLET, FILM COATED ORAL at 09:13

## 2025-05-04 RX ADMIN — DEXAMETHASONE 4 MILLIGRAM(S): 0.5 TABLET ORAL at 05:51

## 2025-05-04 RX ADMIN — ENOXAPARIN SODIUM 40 MILLIGRAM(S): 100 INJECTION SUBCUTANEOUS at 22:34

## 2025-05-04 RX ADMIN — GABAPENTIN 400 MILLIGRAM(S): 400 CAPSULE ORAL at 05:51

## 2025-05-04 RX ADMIN — Medication 40 MILLIGRAM(S): at 05:50

## 2025-05-04 RX ADMIN — Medication 1000 MILLIGRAM(S): at 05:50

## 2025-05-04 RX ADMIN — OXYCODONE HYDROCHLORIDE 5 MILLIGRAM(S): 30 TABLET ORAL at 12:39

## 2025-05-04 RX ADMIN — DEXAMETHASONE 4 MILLIGRAM(S): 0.5 TABLET ORAL at 13:05

## 2025-05-04 RX ADMIN — Medication 1000 MILLIGRAM(S): at 13:05

## 2025-05-04 RX ADMIN — GABAPENTIN 400 MILLIGRAM(S): 400 CAPSULE ORAL at 22:34

## 2025-05-04 RX ADMIN — Medication 400 MILLIGRAM(S): at 09:14

## 2025-05-04 RX ADMIN — LIDOCAINE HYDROCHLORIDE 1 PATCH: 20 JELLY TOPICAL at 01:02

## 2025-05-04 RX ADMIN — OXYCODONE HYDROCHLORIDE 5 MILLIGRAM(S): 30 TABLET ORAL at 13:15

## 2025-05-04 NOTE — PROGRESS NOTE ADULT - SUBJECTIVE AND OBJECTIVE BOX
Chart and meds reviewed.  Patient seen and examined.    5/4 - seems to have had some improvement with the steroid in the last 24 hours with less pain in the right anterior thigh, no complaints    All other systems reviewed and found to be negative with the exception of what has been described above.    MEDICATIONS  (STANDING):  acetaminophen     Tablet .. 1000 milliGRAM(s) Oral every 8 hours  ascorbic acid 500 milliGRAM(s) Oral daily  buPROPion . 100 milliGRAM(s) Oral daily  dexAMETHasone  Injectable 4 milliGRAM(s) IV Push every 6 hours  enoxaparin Injectable 40 milliGRAM(s) SubCutaneous every 12 hours  escitalopram 20 milliGRAM(s) Oral daily  gabapentin 400 milliGRAM(s) Oral three times a day  lidocaine   4% Patch 1 Patch Transdermal daily  losartan 50 milliGRAM(s) Oral daily  magnesium oxide 400 milliGRAM(s) Oral daily  naloxone Injectable 0.4 milliGRAM(s) IV Push once  oxybutynin 5 milliGRAM(s) Oral daily  pantoprazole    Tablet 40 milliGRAM(s) Oral before breakfast  rosuvastatin 20 milliGRAM(s) Oral at bedtime  senna 2 Tablet(s) Oral at bedtime    MEDICATIONS  (PRN):  aluminum hydroxide/magnesium hydroxide/simethicone Suspension 30 milliLiter(s) Oral every 4 hours PRN Dyspepsia  bisacodyl 5 milliGRAM(s) Oral daily PRN Constipation  cyclobenzaprine 10 milliGRAM(s) Oral three times a day PRN Muscle Spasm  HYDROmorphone  Injectable 1 milliGRAM(s) IV Push every 4 hours PRN Severe Pain (7 - 10)  melatonin 3 milliGRAM(s) Oral at bedtime PRN Insomnia  ondansetron Injectable 4 milliGRAM(s) IV Push every 8 hours PRN Nausea and/or Vomiting  oxyCODONE    IR 5 milliGRAM(s) Oral every 4 hours PRN Moderate Pain (4 - 6)  polyethylene glycol 3350 17 Gram(s) Oral daily PRN Constipation    VITAL SIGNS:  T(F): 97.2 (05-04-25 @ 08:00), Max: 99.3 (05-03-25 @ 16:00)  HR: 58 (05-04-25 @ 08:00) (53 - 67)  BP: 144/64 (05-04-25 @ 08:00) (114/72 - 144/64)  RR: 18 (05-04-25 @ 08:00) (18 - 18)  SpO2: 93% (05-04-25 @ 08:00) (93% - 100%)    I&O's Summary    03 May 2025 07:01  -  04 May 2025 07:00  --------------------------------------------------------  IN: 1080 mL / OUT: 0 mL / NET: 1080 mL    04 May 2025 07:01  -  04 May 2025 10:19  --------------------------------------------------------  IN: 240 mL / OUT: 0 mL / NET: 240 mL    PHYSICAL EXAM:  HEENT:  pupils equal and reactive, EOMI, no oropharyngeal lesions, erythema, exudates, oral thrush  NECK:   supple, no carotid bruits  CV:  +S1, +S2, regular, no murmurs  RESP:   lungs clear to auscultation bilaterally, no wheezing, rales, rhonchi, good air entry bilaterally  GI:  abdomen soft, non-tender, non-distended, normal BS  EXT:  no LE edema, less pain with raising the RLE to 45 degrees  NEURO:  AAOX3, no focal neurological deficits, follows all commands, able to move extremities spontaneously  SKIN:  no ulcers, lesions or rashes    LABS:  No new labs

## 2025-05-04 NOTE — PROGRESS NOTE ADULT - ASSESSMENT
64 F with Hx of morbid obesity, HTN, HLD, severe FRANCHESKA (on CPAP), depression, chronic lower back pain, s/p slip and fall onto buttocks about 4 days prior to admission, now admitted with intractable back pain and difficulty walking due to acute L3 vertebral compression fracture.    S/P Mechanical Fall with Acute Back Pain due to Acute L3 Vertebral Compression Fracture complicated by small Epidural Hematoma  -  pain control  -  appreciate ortho spine consult -> spoke with Dr. Steel, no plans for kyphoplasty at this time, but may plan for possible spine surgery early next week if does not improve with steroids  -  mild improvement in the RLE anterior thigh pain with IV steroids - decrease steroids 4mg IV q8  -  muscle relaxants PRN  -  PT/OT as tolerated  -  OOB to chair / ambulate  -  fall precautions  -  neurochecks  -  TLSO brace  -  follow up Vitamin D level    HTN  -  stable, continue Losartan    Hyperlipidemia  -  continue statin    Leukocytosis  -  resolved  -  afebrile    Severe FRANCHESKA / Morbid Obesity / BMI 48  -  continue CPAP qHS  -  takes Zepbound once a week for weight loss, last dose on Monday 4/28    Depression / Anxiety  -  continue Wellbutrin and Lexapro    GERD  -  continue PPI    DVT Prophylaxis  -  venodynes and Lovenox q12    Dispo:  will depend on how she does with pain and PT and is needs spine surgery

## 2025-05-04 NOTE — PROGRESS NOTE ADULT - SUBJECTIVE AND OBJECTIVE BOX
Progress Note:   · Provider Specialty	Orthopedics    Reason for Admission:    Reason for Admission:  · Reason for Admission	s/p fall, L3 burst fracture      · Subjective and Objective:                        Chief Complaint: Back pain, right>leg leg pain    History: Patient fell sustaining L3 burst fracture.  5/4/25 - Resting comfortably in bed.  Leg pain improved but still reports paresthesias in right anterior thigh.     OBJECTIVE:     ICU Vital Signs Last 24 Hrs  T(C): 36.2 (04 May 2025 08:00), Max: 37.4 (03 May 2025 16:00)  T(F): 97.2 (04 May 2025 08:00), Max: 99.3 (03 May 2025 16:00)  HR: 58 (04 May 2025 08:00) (53 - 67)  BP: 144/64 (04 May 2025 08:00) (114/72 - 144/64)  BP(mean): 86 (04 May 2025 00:51) (86 - 86)  ABP: --  ABP(mean): --  RR: 18 (04 May 2025 08:00) (18 - 18)  SpO2: 93% (04 May 2025 08:00) (93% - 100%)    O2 Parameters below as of 04 May 2025 08:00  Patient On (Oxygen Delivery Method): room air        Physical Exam:         Awake and alert         HEENT - unremarkable         Neck - supple         Back: Tenderness Ls region         Bilateral Upper Extremities:             Good Motor Strength             Sensation intact         Bilateral Lower Extremities             Good Motor Strength             Sensation intact, right anterior thigh paresthesias  EXT: No c/c/e; No calf tenderness.               LABS:     05-02    140  |  110[H]  |  15  ----------------------------<  119[H]  3.6   |  24  |  0.72    Ca    9.2      02 May 2025 04:00      PT/INR - ( 02 May 2025 04:00 )   PT: 13.0 sec;   INR: 1.10 ratio         PTT - ( 02 May 2025 04:00 )  PTT:32.8 sec      RADIOLOGY & ADDITIONAL STUDIES: CT Ls spine - demonstrates L3 burst fx with bone retropulsion....MRI LS Spine demonstrates L3 fracture with bone retropulsion and small epidural hematoma.      A/P :  64y Female with L3 burst fracture  -    Extended period of time was spent with the patient discussing her condition and treatment options. Continued conservative treatment including brace management was discussed as was surgery.   Symptomatically she is starting to improve and hopefully turn the corner.  Continue OOB with PT, Pain control.  Rehab planning if she ends up not having surgery.  All questions and concerns addressed.   -    Continue present treatment  -    Plan discussed with Dr. Steel

## 2025-05-05 LAB
24R-OH-CALCIDIOL SERPL-MCNC: 31.4 NG/ML — SIGNIFICANT CHANGE UP
ANION GAP SERPL CALC-SCNC: 6 MMOL/L — SIGNIFICANT CHANGE UP (ref 5–17)
BUN SERPL-MCNC: 29 MG/DL — HIGH (ref 7–23)
CALCIUM SERPL-MCNC: 9.3 MG/DL — SIGNIFICANT CHANGE UP (ref 8.5–10.1)
CHLORIDE SERPL-SCNC: 109 MMOL/L — HIGH (ref 96–108)
CO2 SERPL-SCNC: 23 MMOL/L — SIGNIFICANT CHANGE UP (ref 22–31)
CREAT SERPL-MCNC: 0.73 MG/DL — SIGNIFICANT CHANGE UP (ref 0.5–1.3)
EGFR: 92 ML/MIN/1.73M2 — SIGNIFICANT CHANGE UP
EGFR: 92 ML/MIN/1.73M2 — SIGNIFICANT CHANGE UP
GLUCOSE SERPL-MCNC: 127 MG/DL — HIGH (ref 70–99)
HCT VFR BLD CALC: 43.9 % — SIGNIFICANT CHANGE UP (ref 34.5–45)
HGB BLD-MCNC: 14.3 G/DL — SIGNIFICANT CHANGE UP (ref 11.5–15.5)
MCHC RBC-ENTMCNC: 29.7 PG — SIGNIFICANT CHANGE UP (ref 27–34)
MCHC RBC-ENTMCNC: 32.6 G/DL — SIGNIFICANT CHANGE UP (ref 32–36)
MCV RBC AUTO: 91.3 FL — SIGNIFICANT CHANGE UP (ref 80–100)
NRBC # BLD AUTO: 0 K/UL — SIGNIFICANT CHANGE UP (ref 0–0)
NRBC # FLD: 0 K/UL — SIGNIFICANT CHANGE UP (ref 0–0)
NRBC BLD AUTO-RTO: 0 /100 WBCS — SIGNIFICANT CHANGE UP (ref 0–0)
PLATELET # BLD AUTO: 297 K/UL — SIGNIFICANT CHANGE UP (ref 150–400)
PMV BLD: 9.6 FL — SIGNIFICANT CHANGE UP (ref 7–13)
POTASSIUM SERPL-MCNC: 4.1 MMOL/L — SIGNIFICANT CHANGE UP (ref 3.5–5.3)
POTASSIUM SERPL-SCNC: 4.1 MMOL/L — SIGNIFICANT CHANGE UP (ref 3.5–5.3)
RBC # BLD: 4.81 M/UL — SIGNIFICANT CHANGE UP (ref 3.8–5.2)
RBC # FLD: 14.6 % — HIGH (ref 10.3–14.5)
SODIUM SERPL-SCNC: 138 MMOL/L — SIGNIFICANT CHANGE UP (ref 135–145)
WBC # BLD: 9.33 K/UL — SIGNIFICANT CHANGE UP (ref 3.8–10.5)
WBC # FLD AUTO: 9.33 K/UL — SIGNIFICANT CHANGE UP (ref 3.8–10.5)

## 2025-05-05 PROCEDURE — 99232 SBSQ HOSP IP/OBS MODERATE 35: CPT

## 2025-05-05 RX ORDER — DEXAMETHASONE 0.5 MG/1
4 TABLET ORAL
Refills: 0 | Status: DISCONTINUED | OUTPATIENT
Start: 2025-05-05 | End: 2025-05-06

## 2025-05-05 RX ADMIN — OXYCODONE HYDROCHLORIDE 5 MILLIGRAM(S): 30 TABLET ORAL at 10:56

## 2025-05-05 RX ADMIN — OXYCODONE HYDROCHLORIDE 5 MILLIGRAM(S): 30 TABLET ORAL at 21:55

## 2025-05-05 RX ADMIN — DEXAMETHASONE 4 MILLIGRAM(S): 0.5 TABLET ORAL at 14:50

## 2025-05-05 RX ADMIN — LOSARTAN POTASSIUM 50 MILLIGRAM(S): 100 TABLET, FILM COATED ORAL at 10:12

## 2025-05-05 RX ADMIN — ESCITALOPRAM OXALATE 20 MILLIGRAM(S): 20 TABLET ORAL at 10:12

## 2025-05-05 RX ADMIN — Medication 1000 MILLIGRAM(S): at 05:47

## 2025-05-05 RX ADMIN — Medication 1000 MILLIGRAM(S): at 14:48

## 2025-05-05 RX ADMIN — BUPROPION HYDROBROMIDE 100 MILLIGRAM(S): 522 TABLET, EXTENDED RELEASE ORAL at 10:11

## 2025-05-05 RX ADMIN — Medication 2 TABLET(S): at 21:55

## 2025-05-05 RX ADMIN — OXYCODONE HYDROCHLORIDE 5 MILLIGRAM(S): 30 TABLET ORAL at 14:48

## 2025-05-05 RX ADMIN — Medication 1000 MILLIGRAM(S): at 22:16

## 2025-05-05 RX ADMIN — ENOXAPARIN SODIUM 40 MILLIGRAM(S): 100 INJECTION SUBCUTANEOUS at 10:13

## 2025-05-05 RX ADMIN — LIDOCAINE HYDROCHLORIDE 1 PATCH: 20 JELLY TOPICAL at 22:02

## 2025-05-05 RX ADMIN — OXYBUTYNIN CHLORIDE 5 MILLIGRAM(S): 5 TABLET, FILM COATED, EXTENDED RELEASE ORAL at 10:12

## 2025-05-05 RX ADMIN — Medication 500 MILLIGRAM(S): at 10:11

## 2025-05-05 RX ADMIN — Medication 400 MILLIGRAM(S): at 10:11

## 2025-05-05 RX ADMIN — LIDOCAINE HYDROCHLORIDE 1 PATCH: 20 JELLY TOPICAL at 10:12

## 2025-05-05 RX ADMIN — GABAPENTIN 400 MILLIGRAM(S): 400 CAPSULE ORAL at 05:47

## 2025-05-05 RX ADMIN — Medication 40 MILLIGRAM(S): at 05:49

## 2025-05-05 RX ADMIN — OXYCODONE HYDROCHLORIDE 5 MILLIGRAM(S): 30 TABLET ORAL at 06:17

## 2025-05-05 RX ADMIN — CYCLOBENZAPRINE HYDROCHLORIDE 10 MILLIGRAM(S): 15 CAPSULE, EXTENDED RELEASE ORAL at 08:21

## 2025-05-05 RX ADMIN — GABAPENTIN 400 MILLIGRAM(S): 400 CAPSULE ORAL at 22:15

## 2025-05-05 RX ADMIN — GABAPENTIN 400 MILLIGRAM(S): 400 CAPSULE ORAL at 14:49

## 2025-05-05 RX ADMIN — OXYCODONE HYDROCHLORIDE 5 MILLIGRAM(S): 30 TABLET ORAL at 15:45

## 2025-05-05 RX ADMIN — OXYCODONE HYDROCHLORIDE 5 MILLIGRAM(S): 30 TABLET ORAL at 10:12

## 2025-05-05 RX ADMIN — LIDOCAINE HYDROCHLORIDE 1 PATCH: 20 JELLY TOPICAL at 00:00

## 2025-05-05 RX ADMIN — DEXAMETHASONE 4 MILLIGRAM(S): 0.5 TABLET ORAL at 05:48

## 2025-05-05 RX ADMIN — LIDOCAINE HYDROCHLORIDE 1 PATCH: 20 JELLY TOPICAL at 18:30

## 2025-05-05 RX ADMIN — OXYCODONE HYDROCHLORIDE 5 MILLIGRAM(S): 30 TABLET ORAL at 22:25

## 2025-05-05 RX ADMIN — Medication 1000 MILLIGRAM(S): at 15:45

## 2025-05-05 RX ADMIN — DEXAMETHASONE 4 MILLIGRAM(S): 0.5 TABLET ORAL at 22:16

## 2025-05-05 RX ADMIN — OXYCODONE HYDROCHLORIDE 5 MILLIGRAM(S): 30 TABLET ORAL at 05:47

## 2025-05-05 RX ADMIN — ENOXAPARIN SODIUM 40 MILLIGRAM(S): 100 INJECTION SUBCUTANEOUS at 21:55

## 2025-05-05 RX ADMIN — ROSUVASTATIN CALCIUM 20 MILLIGRAM(S): 20 TABLET, FILM COATED ORAL at 22:16

## 2025-05-05 RX ADMIN — POLYETHYLENE GLYCOL 3350 17 GRAM(S): 17 POWDER, FOR SOLUTION ORAL at 22:15

## 2025-05-05 NOTE — OCCUPATIONAL THERAPY INITIAL EVALUATION ADULT - ADL RETRAINING, OT EVAL
Patient will participate in lower body dressing with MOD I   in 2 weeks  Patient will participate in toilet transfer with  MOD I  in 2 weeks  Patient will participate in toileting with  MOD I    in 2 weeks  Patient will participate in grooming standing at the sink with MOD I   in 2 weeks

## 2025-05-05 NOTE — OCCUPATIONAL THERAPY INITIAL EVALUATION ADULT - LIVES WITH, PROFILE
son, with 3 steps to enter the home with rails., ranch home with tub shower with bench and GBs standard toilet,  and commode, IND prior/children

## 2025-05-05 NOTE — PROGRESS NOTE ADULT - NUTRITIONAL ASSESSMENT
This patient has been assessed with a concern for Malnutrition and has been determined to have a diagnosis/diagnoses of Morbid obesity (BMI > 40).    This patient is being managed with:   Diet Regular-  DASH/TLC {Sodium & Cholesterol Restricted} (DASH)  Entered: May  1 2025  5:37PM  
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
## 2025-05-05 NOTE — PROGRESS NOTE ADULT - ASSESSMENT
64 F with Hx of morbid obesity, HTN, HLD, severe FRANCHESKA (on CPAP), depression, chronic lower back pain, s/p slip and fall onto buttocks about 4 days prior to admission, now admitted with intractable back pain and difficulty walking due to acute L3 vertebral compression fracture.    S/P Mechanical Fall with Acute Back Pain due to Acute L3 Vertebral Compression Fracture complicated by small Epidural Hematoma  -  pain control  -  appreciate ortho spine consult -> no plans for kyphoplasty at this time  -  mild improvement in the RLE anterior thigh pain with IV steroids - decrease steroids 4mg IV q8- will change to po in 24 hours   -  muscle relaxants PRN  -  PT/OT as tolerated  -  OOB to chair / ambulate  -  fall precautions  -  neurochecks  -  TLSO brace  -  follow up Vitamin D level  -  patient wants to be discharged to rehab     HTN  -  stable, continue Losartan    Hyperlipidemia  -  continue statin    Leukocytosis  -  resolved  -  afebrile    Severe FRANCHESKA / Morbid Obesity / BMI 48  -  continue CPAP qHS  -  takes Zepbound once a week for weight loss, last dose on Monday 4/28    Depression / Anxiety  -  continue Wellbutrin and Lexapro    GERD  -  continue PPI    DVT Prophylaxis  -  venodynes and Lovenox q12    Dispo:  possible dc in 24 hours-  Case d/w team on IDR.

## 2025-05-05 NOTE — PROGRESS NOTE ADULT - REASON FOR ADMISSION
s/p fall, L3 compression fracture
s/p fall, L3 burst fracture
s/p fall, L3 compression fracture

## 2025-05-05 NOTE — OCCUPATIONAL THERAPY INITIAL EVALUATION ADULT - MD ORDER
MD orders received. Chart reviewed, contents noted, conferred with RN.  Pt tolerated OT evaluation, see initial evaluation for findings. Please refer to Therapeutic Interventions under Adult A & I for future documentation and treatment notes.

## 2025-05-05 NOTE — PROGRESS NOTE ADULT - TIME BILLING
Case discussed with patient, nursing, Dr. Steel, Dr. Ramirez, PT
Case discussed with patient, nursing, Dr. Steel

## 2025-05-05 NOTE — PROGRESS NOTE ADULT - PROVIDER SPECIALTY LIST ADULT
Orthopedics
Orthopedics
Hospitalist
Orthopedics
Hospitalist

## 2025-05-05 NOTE — PROGRESS NOTE ADULT - SUBJECTIVE AND OBJECTIVE BOX
Chart and meds reviewed.  Patient seen and examined.    5/5- as per patient report- pain is improved with IV steroids- she has yet ot work with PT. VSS- denies CP or SOB.      All other systems reviewed and found to be negative with the exception of what has been described above.    MEDICATIONS  (STANDING):  acetaminophen     Tablet .. 1000 milliGRAM(s) Oral every 8 hours  ascorbic acid 500 milliGRAM(s) Oral daily  buPROPion . 100 milliGRAM(s) Oral daily  dexAMETHasone  Injectable 4 milliGRAM(s) IV Push every 8 hours  enoxaparin Injectable 40 milliGRAM(s) SubCutaneous every 12 hours  escitalopram 20 milliGRAM(s) Oral daily  gabapentin 400 milliGRAM(s) Oral three times a day  lidocaine   4% Patch 1 Patch Transdermal daily  losartan 50 milliGRAM(s) Oral daily  magnesium oxide 400 milliGRAM(s) Oral daily  naloxone Injectable 0.4 milliGRAM(s) IV Push once  oxybutynin 5 milliGRAM(s) Oral daily  pantoprazole    Tablet 40 milliGRAM(s) Oral before breakfast  rosuvastatin 20 milliGRAM(s) Oral at bedtime  senna 2 Tablet(s) Oral at bedtime    MEDICATIONS  (PRN):  aluminum hydroxide/magnesium hydroxide/simethicone Suspension 30 milliLiter(s) Oral every 4 hours PRN Dyspepsia  bisacodyl 5 milliGRAM(s) Oral daily PRN Constipation  cyclobenzaprine 10 milliGRAM(s) Oral three times a day PRN Muscle Spasm  HYDROmorphone  Injectable 1 milliGRAM(s) IV Push every 4 hours PRN Severe Pain (7 - 10)  melatonin 3 milliGRAM(s) Oral at bedtime PRN Insomnia  ondansetron Injectable 4 milliGRAM(s) IV Push every 8 hours PRN Nausea and/or Vomiting  oxyCODONE    IR 5 milliGRAM(s) Oral every 4 hours PRN Moderate Pain (4 - 6)  polyethylene glycol 3350 17 Gram(s) Oral daily PRN Constipation      Vital Signs Last 24 Hrs  T(C): 36.7 (05 May 2025 08:00), Max: 37.1 (04 May 2025 23:55)  T(F): 98.1 (05 May 2025 08:00), Max: 98.7 (04 May 2025 23:55)  HR: 59 (05 May 2025 08:00) (51 - 69)  BP: 124/74 (05 May 2025 08:00) (115/66 - 124/74)  BP(mean): --  RR: 18 (05 May 2025 08:00) (18 - 18)  SpO2: 100% (05 May 2025 08:00) (91% - 100%)    Parameters below as of 05 May 2025 08:00  Patient On (Oxygen Delivery Method): room air      PE:  Constitutional: NAD, OOB to chair   HEENT: NC/AT  Back: no tenderness  Respiratory: respirations even and non labored, LCTA  Cardiovascular: S1S2 regular, no murmurs  Abdomen: soft, not tender, not distended, positive BS  Genitourinary: voiding  Musculoskeletal: no muscle tenderness, no joint swelling or tenderness  Extremities: no pedal edema   Neurological: no focal deficits     LABS:      05-05    138  |  109[H]  |  29[H]  ----------------------------<  127[H]  4.1   |  23  |  0.73    Ca    9.3      05 May 2025 07:30                            14.3   9.33  )-----------( 297      ( 05 May 2025 07:30 )             43.9

## 2025-05-06 ENCOUNTER — TRANSCRIPTION ENCOUNTER (OUTPATIENT)
Age: 65
End: 2025-05-06

## 2025-05-06 VITALS
RESPIRATION RATE: 18 BRPM | SYSTOLIC BLOOD PRESSURE: 129 MMHG | HEART RATE: 66 BPM | DIASTOLIC BLOOD PRESSURE: 62 MMHG | OXYGEN SATURATION: 100 %

## 2025-05-06 PROCEDURE — 99239 HOSP IP/OBS DSCHRG MGMT >30: CPT

## 2025-05-06 PROCEDURE — 99221 1ST HOSP IP/OBS SF/LOW 40: CPT

## 2025-05-06 RX ORDER — OMEPRAZOLE 20 MG/1
1 CAPSULE, DELAYED RELEASE ORAL
Refills: 0 | DISCHARGE

## 2025-05-06 RX ORDER — ASPIRIN 325 MG
1 TABLET ORAL
Qty: 0 | Refills: 0 | DISCHARGE

## 2025-05-06 RX ORDER — ACETAMINOPHEN 500 MG/5ML
2 LIQUID (ML) ORAL
Refills: 0 | DISCHARGE

## 2025-05-06 RX ORDER — CYCLOBENZAPRINE HYDROCHLORIDE 15 MG/1
1 CAPSULE, EXTENDED RELEASE ORAL
Qty: 0 | Refills: 0 | DISCHARGE
Start: 2025-05-06

## 2025-05-06 RX ORDER — LIDOCAINE HYDROCHLORIDE 20 MG/ML
1 JELLY TOPICAL
Qty: 0 | Refills: 0 | DISCHARGE
Start: 2025-05-06

## 2025-05-06 RX ORDER — GABAPENTIN 400 MG/1
1 CAPSULE ORAL
Qty: 0 | Refills: 0 | DISCHARGE
Start: 2025-05-06

## 2025-05-06 RX ORDER — MELATONIN 5 MG
1 TABLET ORAL
Qty: 0 | Refills: 0 | DISCHARGE
Start: 2025-05-06

## 2025-05-06 RX ORDER — POLYETHYLENE GLYCOL 3350 17 G/17G
17 POWDER, FOR SOLUTION ORAL
Qty: 0 | Refills: 0 | DISCHARGE
Start: 2025-05-06

## 2025-05-06 RX ORDER — DEXAMETHASONE 0.5 MG/1
1 TABLET ORAL
Qty: 0 | Refills: 0 | DISCHARGE

## 2025-05-06 RX ORDER — ENOXAPARIN SODIUM 100 MG/ML
40 INJECTION SUBCUTANEOUS
Qty: 0 | Refills: 0 | DISCHARGE
Start: 2025-05-06

## 2025-05-06 RX ORDER — MELOXICAM 15 MG/1
1 TABLET ORAL
Qty: 0 | Refills: 0 | DISCHARGE

## 2025-05-06 RX ORDER — SENNA 187 MG
2 TABLET ORAL
Qty: 0 | Refills: 0 | DISCHARGE
Start: 2025-05-06

## 2025-05-06 RX ORDER — ACETAMINOPHEN 500 MG/5ML
2 LIQUID (ML) ORAL
Qty: 0 | Refills: 0 | DISCHARGE
Start: 2025-05-06

## 2025-05-06 RX ORDER — OXYCODONE HYDROCHLORIDE 30 MG/1
1 TABLET ORAL
Qty: 0 | Refills: 0 | DISCHARGE
Start: 2025-05-06

## 2025-05-06 RX ORDER — BISACODYL 5 MG
1 TABLET, DELAYED RELEASE (ENTERIC COATED) ORAL
Qty: 0 | Refills: 0 | DISCHARGE
Start: 2025-05-06

## 2025-05-06 RX ADMIN — DEXAMETHASONE 4 MILLIGRAM(S): 0.5 TABLET ORAL at 10:17

## 2025-05-06 RX ADMIN — BUPROPION HYDROBROMIDE 100 MILLIGRAM(S): 522 TABLET, EXTENDED RELEASE ORAL at 10:18

## 2025-05-06 RX ADMIN — Medication 500 MILLIGRAM(S): at 10:17

## 2025-05-06 RX ADMIN — OXYBUTYNIN CHLORIDE 5 MILLIGRAM(S): 5 TABLET, FILM COATED, EXTENDED RELEASE ORAL at 10:17

## 2025-05-06 RX ADMIN — Medication 1000 MILLIGRAM(S): at 05:15

## 2025-05-06 RX ADMIN — Medication 400 MILLIGRAM(S): at 10:17

## 2025-05-06 RX ADMIN — LOSARTAN POTASSIUM 50 MILLIGRAM(S): 100 TABLET, FILM COATED ORAL at 10:17

## 2025-05-06 RX ADMIN — ESCITALOPRAM OXALATE 20 MILLIGRAM(S): 20 TABLET ORAL at 10:17

## 2025-05-06 RX ADMIN — LIDOCAINE HYDROCHLORIDE 1 PATCH: 20 JELLY TOPICAL at 10:16

## 2025-05-06 RX ADMIN — ENOXAPARIN SODIUM 40 MILLIGRAM(S): 100 INJECTION SUBCUTANEOUS at 10:16

## 2025-05-06 RX ADMIN — Medication 1000 MILLIGRAM(S): at 05:06

## 2025-05-06 RX ADMIN — GABAPENTIN 400 MILLIGRAM(S): 400 CAPSULE ORAL at 05:06

## 2025-05-06 RX ADMIN — Medication 40 MILLIGRAM(S): at 05:06

## 2025-05-06 NOTE — DISCHARGE NOTE PROVIDER - DETAILS OF MALNUTRITION DIAGNOSIS/DIAGNOSES
This patient has been assessed with a concern for Malnutrition and was treated during this hospitalization for the following Nutrition diagnosis/diagnoses:     -  05/02/2025: Morbid obesity (BMI > 40)

## 2025-05-06 NOTE — DISCHARGE NOTE NURSING/CASE MANAGEMENT/SOCIAL WORK - FINANCIAL ASSISTANCE
Health system provides services at a reduced cost to those who are determined to be eligible through Health system’s financial assistance program. Information regarding Health system’s financial assistance program can be found by going to [email or web address] or by calling [phone].

## 2025-05-06 NOTE — DISCHARGE NOTE PROVIDER - CARE PROVIDERS DIRECT ADDRESSES
,DirectAddress_Unknown,[email or web address]

## 2025-05-06 NOTE — DISCHARGE NOTE PROVIDER - CARE PROVIDER_API CALL
Farahmandpour, Behrouz  [facility]  [address] [place] [address]  Phone: [phone]  Fax: [phone]  Follow Up Time:     Mj Steel  Orthopaedic Surgery  [address] [place] [id number]  Phone: [phone]  Fax: [phone]  Follow Up Time:

## 2025-05-06 NOTE — DISCHARGE NOTE PROVIDER - NSDCCPCAREPLAN_GEN_ALL_CORE_FT
PRINCIPAL DISCHARGE DIAGNOSIS  Diagnosis: Fracture of lumbar spine  Assessment and Plan of Treatment: pain meds as per your dc medication paperwork  f/u with Dr Steel as an outpatient  ensure that your brace is on when out of bed  PT as per rehab      SECONDARY DISCHARGE DIAGNOSES  Diagnosis: HTN (hypertension)  Assessment and Plan of Treatment: continue blood pressure meds

## 2025-05-06 NOTE — DISCHARGE NOTE PROVIDER - NSDCMRMEDTOKEN_GEN_ALL_CORE_FT
acetaminophen 500 mg oral tablet: 2 tab(s) orally every 8 hours as needed for  mild pain  ascorbic acid 500 mg oral tablet: 1 tab(s) orally once a day  aspirin 81 mg oral delayed release tablet: 1 tab(s) orally every other day  bisacodyl 5 mg oral delayed release tablet: 1 tab(s) orally once a day As needed Constipation  buPROPion 100 mg oral tablet: 1 tab(s) orally once a day  Caltrate 600 mg oral tablet: 1 tab(s) orally once a day  cholecalciferol 25 mcg (1000 intl units) oral tablet: 1 tab(s) orally once a day  Co-Q10 100 mg oral capsule: 1 cap(s) orally once a day  Colace 100 mg oral capsule: 1 cap(s) orally once a day as needed for  constipation  cyclobenzaprine 10 mg oral tablet: 1 tab(s) orally 3 times a day As needed Muscle Spasm  Decadron 4 mg oral tablet: 1 tab(s) orally once a day decadron x3 days  enoxaparin: 40 milligram(s) subcutaneous every 12 hours  escitalopram 20 mg oral tablet: 1 tab(s) orally once a day  gabapentin 400 mg oral capsule: 1 cap(s) orally 3 times a day  lidocaine 4% topical film: Apply topically to affected area once a day  losartan 50 mg oral tablet: 1 tab(s) orally once a day  magnesium oxide 400 mg oral tablet: 1 tab(s) orally once a day  melatonin 3 mg oral tablet: 1 tab(s) orally once a day (at bedtime) As needed Insomnia  meloxicam 15 mg oral tablet: 1 tab(s) orally once a day start after steroids has been completed 5/9  Multiple Vitamins oral tablet: 1 tab(s) orally once a day  oxyBUTYnin 5 mg/24 hours oral tablet, extended release: 1 tab(s) orally once a day  oxyCODONE 5 mg oral tablet: 1 tab(s) orally every 4 hours as needed for  severe pain 1/2 tab for mod  pain every 4 hours  1 tabs for severe pain every 4 hours as needed  pantoprazole 40 mg oral delayed release tablet: 1 tab(s) orally once a day (before a meal)  polyethylene glycol 3350 oral powder for reconstitution: 17 gram(s) orally once a day As needed Constipation  rosuvastatin 20 mg oral tablet: 1 tab(s) orally every other day  senna leaf extract oral tablet: 2 tab(s) orally once a day (at bedtime)  traMADol 50 mg oral tablet: 1 tab(s) orally once a day  Zepbound 5 mg/0.5 mL subcutaneous solution: 5 milligram(s) subcutaneously once a week ***Monday***

## 2025-05-06 NOTE — DISCHARGE NOTE PROVIDER - HOSPITAL COURSE
64 F with Hx of morbid obesity, HTN, HLD, severe FRANCHESKA (on CPAP), depression, chronic lower back pain, s/p slip and fall onto buttocks about 4 days prior to admission, now admitted with intractable back pain and difficulty walking due to acute L3 vertebral compression fracture.    S/P Mechanical Fall with Acute Back Pain due to Acute L3 Vertebral Compression Fracture complicated by small Epidural Hematoma  -  pain control  -  appreciate ortho spine consult -> no plans for kyphoplasty at this time  -  mild improvement in the RLE anterior thigh pain with IV steroids - will dc on 3 days of decadron   -  muscle relaxants PRN  -  PT/OT as tolerated  -  OOB to chair / ambulate  -  fall precautions  -  TLSO brace  -  outpatient f/u with Ortho     HTN  -  stable, continue Losartan    Hyperlipidemia  -  continue statin    Leukocytosis  -  resolved  -  afebrile    Severe FRANCHESKA / Morbid Obesity / BMI 48  -  continue CPAP qHS  -  takes Zepbound once a week for weight loss, last dose on Monday 4/28    Depression / Anxiety  -  continue Wellbutrin and Lexapro    GERD  -  continue PPI    DVT Prophylaxis  -  venodynes and ambulation     Case d/w team on IDR. DC to rehab.

## 2025-05-06 NOTE — DISCHARGE NOTE PROVIDER - ATTENDING DISCHARGE PHYSICAL EXAMINATION:
Patient seen and examined with GRACE Faye.  I was physically present for the key portions of the evaluation and management (E/M) service provided.  I agree with the above history, physical, and plan which I have reviewed and edited where appropriate.     resp cta b/l  cvs + s1 s2 RR    S/P Mechanical Fall with Acute Back Pain due to Acute L3 Vertebral Compression Fracture complicated by small Epidural Hematoma  -  pain control  -  appreciate ortho spine consult -> no plans for kyphoplasty at this time  -  mild improvement in the RLE anterior thigh pain with IV steroids - will dc on 3 days of decadron   -  muscle relaxants PRN  -  PT/OT as tolerated  -  OOB to chair / ambulate  -  fall precautions  -  TLSO brace  -  outpatient f/u with Ortho     HTN  -  stable, continue Losartan    Hyperlipidemia  -  continue statin    Leukocytosis  -  resolved  -  afebrile    Severe FRANCHESKA / Morbid Obesity / BMI 48  -  continue CPAP qHS  -  takes Zepbound once a week for weight loss, last dose on Monday 4/28    Depression / Anxiety  -  continue Wellbutrin and Lexapro    GERD  -  continue PPI    DVT Prophylaxis  -  venodynes and ambulation     Case d/w team on IDR. DC to rehab.

## 2025-05-06 NOTE — DISCHARGE NOTE NURSING/CASE MANAGEMENT/SOCIAL WORK - PATIENT PORTAL LINK FT
You can access the FollowMyHealth Patient Portal offered by [facility] by registering at the following website: [email or web address]. By joining [facility]’s FollowMyHealth portal, you will also be able to view your health information using other applications (apps) compatible with our system.

## 2025-05-06 NOTE — DISCHARGE NOTE PROVIDER - NSDCCAREPROVSEEN_GEN_ALL_CORE_FT
Damián, Mj Singh, Nimesh Luo, Juan Ramirez, Joey Lynch, Sukhwinder Iraheta, Misael Odell, Yudith Kevin, Braxton Campos, Aliya Ferro, Jose E Faye, Kathleen

## 2025-05-12 DIAGNOSIS — I10 ESSENTIAL (PRIMARY) HYPERTENSION: ICD-10-CM

## 2025-05-12 DIAGNOSIS — S06.4X0A EPIDURAL HEMORRHAGE WITHOUT LOSS OF CONSCIOUSNESS, INITIAL ENCOUNTER: ICD-10-CM

## 2025-05-12 DIAGNOSIS — Z86.16 PERSONAL HISTORY OF COVID-19: ICD-10-CM

## 2025-05-12 DIAGNOSIS — Z91.81 HISTORY OF FALLING: ICD-10-CM

## 2025-05-12 DIAGNOSIS — M25.561 PAIN IN RIGHT KNEE: ICD-10-CM

## 2025-05-12 DIAGNOSIS — G89.29 OTHER CHRONIC PAIN: ICD-10-CM

## 2025-05-12 DIAGNOSIS — S32.031A STABLE BURST FRACTURE OF THIRD LUMBAR VERTEBRA, INITIAL ENCOUNTER FOR CLOSED FRACTURE: ICD-10-CM

## 2025-05-12 DIAGNOSIS — F32.A DEPRESSION, UNSPECIFIED: ICD-10-CM

## 2025-05-12 DIAGNOSIS — M51.16 INTERVERTEBRAL DISC DISORDERS WITH RADICULOPATHY, LUMBAR REGION: ICD-10-CM

## 2025-05-12 DIAGNOSIS — Z88.0 ALLERGY STATUS TO PENICILLIN: ICD-10-CM

## 2025-05-12 DIAGNOSIS — E66.01 MORBID (SEVERE) OBESITY DUE TO EXCESS CALORIES: ICD-10-CM

## 2025-05-12 DIAGNOSIS — E78.5 HYPERLIPIDEMIA, UNSPECIFIED: ICD-10-CM

## 2025-05-12 DIAGNOSIS — K21.9 GASTRO-ESOPHAGEAL REFLUX DISEASE WITHOUT ESOPHAGITIS: ICD-10-CM

## 2025-05-12 DIAGNOSIS — G47.33 OBSTRUCTIVE SLEEP APNEA (ADULT) (PEDIATRIC): ICD-10-CM

## 2025-05-12 DIAGNOSIS — D72.829 ELEVATED WHITE BLOOD CELL COUNT, UNSPECIFIED: ICD-10-CM
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
## 2025-05-12 DIAGNOSIS — Z99.89 DEPENDENCE ON OTHER ENABLING MACHINES AND DEVICES: ICD-10-CM
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
## 2025-05-15 PROBLEM — E66.9 OBESITY, UNSPECIFIED: Chronic | Status: ACTIVE | Noted: 2025-05-01

## 2025-05-15 PROBLEM — I10 ESSENTIAL (PRIMARY) HYPERTENSION: Chronic | Status: ACTIVE | Noted: 2025-05-01

## 2025-05-15 PROBLEM — F41.9 ANXIETY DISORDER, UNSPECIFIED: Chronic | Status: ACTIVE | Noted: 2025-05-01

## 2025-05-17 ENCOUNTER — NON-APPOINTMENT (OUTPATIENT)
Age: 65
End: 2025-05-17

## 2025-05-19 ENCOUNTER — APPOINTMENT (OUTPATIENT)
Dept: RADIOLOGY | Facility: CLINIC | Age: 65
End: 2025-05-19
Payer: COMMERCIAL

## 2025-05-19 ENCOUNTER — OUTPATIENT (OUTPATIENT)
Dept: OUTPATIENT SERVICES | Facility: HOSPITAL | Age: 65
LOS: 1 days | End: 2025-05-19
Payer: COMMERCIAL

## 2025-05-19 ENCOUNTER — APPOINTMENT (OUTPATIENT)
Dept: NEUROSURGERY | Facility: CLINIC | Age: 65
End: 2025-05-19
Payer: COMMERCIAL

## 2025-05-19 VITALS
HEIGHT: 67 IN | DIASTOLIC BLOOD PRESSURE: 70 MMHG | SYSTOLIC BLOOD PRESSURE: 108 MMHG | OXYGEN SATURATION: 95 % | HEART RATE: 76 BPM | WEIGHT: 293 LBS | BODY MASS INDEX: 45.99 KG/M2

## 2025-05-19 DIAGNOSIS — Z78.9 OTHER SPECIFIED HEALTH STATUS: Chronic | ICD-10-CM

## 2025-05-19 DIAGNOSIS — S32.030A WEDGE COMPRESSION FRACTURE OF THIRD LUMBAR VERTEBRA, INITIAL ENCOUNTER FOR CLOSED FRACTURE: ICD-10-CM

## 2025-05-19 PROCEDURE — 72100 X-RAY EXAM L-S SPINE 2/3 VWS: CPT | Mod: 26

## 2025-05-19 PROCEDURE — 99204 OFFICE O/P NEW MOD 45 MIN: CPT

## 2025-05-19 PROCEDURE — 72100 X-RAY EXAM L-S SPINE 2/3 VWS: CPT

## 2025-05-20 PROBLEM — S32.030A COMPRESSION FRACTURE OF L3 VERTEBRA: Status: ACTIVE | Noted: 2025-05-19

## 2025-05-23 ENCOUNTER — TRANSCRIPTION ENCOUNTER (OUTPATIENT)
Age: 65
End: 2025-05-23

## 2025-06-06 ENCOUNTER — APPOINTMENT (OUTPATIENT)
Dept: RADIOLOGY | Facility: CLINIC | Age: 65
End: 2025-06-06
Payer: COMMERCIAL

## 2025-06-06 ENCOUNTER — OUTPATIENT (OUTPATIENT)
Dept: OUTPATIENT SERVICES | Facility: HOSPITAL | Age: 65
LOS: 1 days | End: 2025-06-06
Payer: COMMERCIAL

## 2025-06-06 DIAGNOSIS — S32.030A WEDGE COMPRESSION FRACTURE OF THIRD LUMBAR VERTEBRA, INITIAL ENCOUNTER FOR CLOSED FRACTURE: ICD-10-CM

## 2025-06-06 DIAGNOSIS — Z78.9 OTHER SPECIFIED HEALTH STATUS: Chronic | ICD-10-CM

## 2025-06-06 DIAGNOSIS — Z00.8 ENCOUNTER FOR OTHER GENERAL EXAMINATION: ICD-10-CM

## 2025-06-06 PROCEDURE — 72100 X-RAY EXAM L-S SPINE 2/3 VWS: CPT

## 2025-06-06 PROCEDURE — 77080 DXA BONE DENSITY AXIAL: CPT | Mod: 26

## 2025-06-06 PROCEDURE — 77080 DXA BONE DENSITY AXIAL: CPT

## 2025-06-06 PROCEDURE — 72100 X-RAY EXAM L-S SPINE 2/3 VWS: CPT | Mod: 26

## 2025-06-09 ENCOUNTER — APPOINTMENT (OUTPATIENT)
Dept: NEUROSURGERY | Facility: CLINIC | Age: 65
End: 2025-06-09
Payer: COMMERCIAL

## 2025-06-09 VITALS
OXYGEN SATURATION: 94 % | DIASTOLIC BLOOD PRESSURE: 71 MMHG | BODY MASS INDEX: 45.99 KG/M2 | HEART RATE: 62 BPM | SYSTOLIC BLOOD PRESSURE: 103 MMHG | HEIGHT: 67 IN | WEIGHT: 293 LBS

## 2025-06-09 PROCEDURE — 99214 OFFICE O/P EST MOD 30 MIN: CPT

## 2025-06-16 ENCOUNTER — TRANSCRIPTION ENCOUNTER (OUTPATIENT)
Age: 65
End: 2025-06-16

## 2025-06-26 ENCOUNTER — APPOINTMENT (OUTPATIENT)
Dept: INTERNAL MEDICINE | Facility: CLINIC | Age: 65
End: 2025-06-26
Payer: COMMERCIAL

## 2025-06-26 VITALS
WEIGHT: 290 LBS | HEART RATE: 65 BPM | OXYGEN SATURATION: 96 % | DIASTOLIC BLOOD PRESSURE: 68 MMHG | SYSTOLIC BLOOD PRESSURE: 111 MMHG | BODY MASS INDEX: 45.52 KG/M2 | TEMPERATURE: 97.5 F | HEIGHT: 67 IN

## 2025-06-26 PROBLEM — Z82.5 FAMILY HISTORY OF CHRONIC OBSTRUCTIVE PULMONARY DISEASE: Status: ACTIVE | Noted: 2025-06-26

## 2025-06-26 PROBLEM — Z82.62 FAMILY HISTORY OF OSTEOPOROSIS: Status: ACTIVE | Noted: 2025-06-26

## 2025-06-26 PROBLEM — Z82.49 FAMILY HISTORY OF HYPERTENSION: Status: ACTIVE | Noted: 2025-06-26

## 2025-06-26 PROBLEM — F41.9 ANXIETY AND DEPRESSION: Status: ACTIVE | Noted: 2025-06-26

## 2025-06-26 PROBLEM — R87.619 ABNORMAL PAP SMEAR OF CERVIX: Status: ACTIVE | Noted: 2025-06-26

## 2025-06-26 PROBLEM — Z81.8 FAMILY HISTORY OF DEMENTIA: Status: ACTIVE | Noted: 2025-06-26

## 2025-06-26 PROBLEM — Z81.8 FAMILY HISTORY OF DEPRESSION: Status: ACTIVE | Noted: 2025-06-26

## 2025-06-26 PROBLEM — E27.9 ADRENAL NODULE: Status: ACTIVE | Noted: 2020-12-10

## 2025-06-26 PROCEDURE — G2211 COMPLEX E/M VISIT ADD ON: CPT

## 2025-06-26 PROCEDURE — 99214 OFFICE O/P EST MOD 30 MIN: CPT

## 2025-06-26 RX ORDER — OXYBUTYNIN CHLORIDE 5 MG/1
5 TABLET ORAL
Refills: 0 | Status: ACTIVE | COMMUNITY

## 2025-06-26 RX ORDER — CHOLECALCIFEROL (VITAMIN D3) 25 MCG
25 MCG TABLET ORAL
Refills: 0 | Status: ACTIVE | COMMUNITY

## 2025-06-26 RX ORDER — GABAPENTIN 400 MG/1
400 CAPSULE ORAL
Refills: 0 | Status: ACTIVE | COMMUNITY

## 2025-07-08 ENCOUNTER — TRANSCRIPTION ENCOUNTER (OUTPATIENT)
Age: 65
End: 2025-07-08

## 2025-07-21 ENCOUNTER — APPOINTMENT (OUTPATIENT)
Dept: NEUROSURGERY | Facility: CLINIC | Age: 65
End: 2025-07-21
Payer: COMMERCIAL

## 2025-07-21 DIAGNOSIS — R20.8 OTHER DISTURBANCES OF SKIN SENSATION: ICD-10-CM

## 2025-07-21 DIAGNOSIS — S32.030A WEDGE COMPRESSION FRACTURE OF THIRD LUMBAR VERTEBRA, INITIAL ENCOUNTER FOR CLOSED FRACTURE: ICD-10-CM

## 2025-07-21 PROCEDURE — 99214 OFFICE O/P EST MOD 30 MIN: CPT

## 2025-07-29 ENCOUNTER — APPOINTMENT (OUTPATIENT)
Dept: PHYSICAL MEDICINE AND REHAB | Facility: CLINIC | Age: 65
End: 2025-07-29
Payer: COMMERCIAL

## 2025-07-29 VITALS
RESPIRATION RATE: 16 BRPM | DIASTOLIC BLOOD PRESSURE: 79 MMHG | BODY MASS INDEX: 45.52 KG/M2 | HEART RATE: 67 BPM | WEIGHT: 290 LBS | HEIGHT: 67 IN | SYSTOLIC BLOOD PRESSURE: 130 MMHG | OXYGEN SATURATION: 96 %

## 2025-07-29 DIAGNOSIS — M17.0 BILATERAL PRIMARY OSTEOARTHRITIS OF KNEE: ICD-10-CM

## 2025-07-29 PROCEDURE — 99204 OFFICE O/P NEW MOD 45 MIN: CPT | Mod: GC

## 2025-08-08 ENCOUNTER — APPOINTMENT (OUTPATIENT)
Dept: NEUROLOGY | Facility: CLINIC | Age: 65
End: 2025-08-08
Payer: COMMERCIAL

## 2025-08-08 VITALS
HEART RATE: 71 BPM | TEMPERATURE: 98.2 F | HEIGHT: 67 IN | WEIGHT: 282 LBS | BODY MASS INDEX: 44.26 KG/M2 | DIASTOLIC BLOOD PRESSURE: 83 MMHG | SYSTOLIC BLOOD PRESSURE: 118 MMHG

## 2025-08-08 DIAGNOSIS — G62.89 OTHER SPECIFIED POLYNEUROPATHIES: ICD-10-CM

## 2025-08-08 PROCEDURE — 99205 OFFICE O/P NEW HI 60 MIN: CPT

## 2025-08-22 ENCOUNTER — APPOINTMENT (OUTPATIENT)
Dept: INTERNAL MEDICINE | Facility: CLINIC | Age: 65
End: 2025-08-22

## 2025-08-22 DIAGNOSIS — F41.9 ANXIETY DISORDER, UNSPECIFIED: ICD-10-CM

## 2025-08-22 DIAGNOSIS — E27.9 DISORDER OF ADRENAL GLAND, UNSPECIFIED: ICD-10-CM

## 2025-08-22 DIAGNOSIS — S32.030A WEDGE COMPRESSION FRACTURE OF THIRD LUMBAR VERTEBRA, INITIAL ENCOUNTER FOR CLOSED FRACTURE: ICD-10-CM

## 2025-08-22 DIAGNOSIS — Z12.39 ENCOUNTER FOR OTHER SCREENING FOR MALIGNANT NEOPLASM OF BREAST: ICD-10-CM

## 2025-08-22 DIAGNOSIS — G89.29 OTHER CHRONIC PAIN: ICD-10-CM

## 2025-08-22 DIAGNOSIS — Z23 ENCOUNTER FOR IMMUNIZATION: ICD-10-CM

## 2025-08-22 DIAGNOSIS — E04.2 NONTOXIC MULTINODULAR GOITER: ICD-10-CM

## 2025-08-22 DIAGNOSIS — Z12.2 ENCOUNTER FOR SCREENING FOR MALIGNANT NEOPLASM OF RESPIRATORY ORGANS: ICD-10-CM

## 2025-08-22 DIAGNOSIS — E78.5 HYPERLIPIDEMIA, UNSPECIFIED: ICD-10-CM

## 2025-08-22 DIAGNOSIS — F51.4 SLEEP TERRORS [NIGHT TERRORS]: ICD-10-CM

## 2025-08-22 DIAGNOSIS — F32.A ANXIETY DISORDER, UNSPECIFIED: ICD-10-CM

## 2025-08-22 DIAGNOSIS — H90.3 SENSORINEURAL HEARING LOSS, BILATERAL: ICD-10-CM

## 2025-08-22 DIAGNOSIS — G47.33 OBSTRUCTIVE SLEEP APNEA (ADULT) (PEDIATRIC): ICD-10-CM

## 2025-08-22 DIAGNOSIS — R60.9 EDEMA, UNSPECIFIED: ICD-10-CM

## 2025-08-22 DIAGNOSIS — Z00.00 ENCOUNTER FOR GENERAL ADULT MEDICAL EXAMINATION W/OUT ABNORMAL FINDINGS: ICD-10-CM

## 2025-08-22 DIAGNOSIS — G62.89 OTHER SPECIFIED POLYNEUROPATHIES: ICD-10-CM

## 2025-08-22 DIAGNOSIS — E66.01 MORBID (SEVERE) OBESITY DUE TO EXCESS CALORIES: ICD-10-CM

## 2025-08-22 PROCEDURE — 99396 PREV VISIT EST AGE 40-64: CPT | Mod: 25

## 2025-08-22 PROCEDURE — G0009: CPT

## 2025-08-22 PROCEDURE — G0447 BEHAVIOR COUNSEL OBESITY 15M: CPT | Mod: 59

## 2025-08-22 PROCEDURE — 90677 PCV20 VACCINE IM: CPT

## 2025-08-22 PROCEDURE — G0403: CPT

## 2025-08-25 ENCOUNTER — TRANSCRIPTION ENCOUNTER (OUTPATIENT)
Age: 65
End: 2025-08-25

## 2025-08-25 ENCOUNTER — NON-APPOINTMENT (OUTPATIENT)
Age: 65
End: 2025-08-25

## 2025-08-25 LAB
25(OH)D3 SERPL-MCNC: 46.9 NG/ML
ALBUMIN SERPL ELPH-MCNC: 4.3 G/DL
ALP BLD-CCNC: 89 U/L
ALT SERPL-CCNC: 22 U/L
ANION GAP SERPL CALC-SCNC: 16 MMOL/L
AST SERPL-CCNC: 13 U/L
BASOPHILS # BLD AUTO: 0.02 K/UL
BASOPHILS NFR BLD AUTO: 0.2 %
BILIRUB SERPL-MCNC: 0.8 MG/DL
BUN SERPL-MCNC: 14 MG/DL
CALCIUM SERPL-MCNC: 9.8 MG/DL
CHLORIDE SERPL-SCNC: 104 MMOL/L
CHOLEST SERPL-MCNC: 149 MG/DL
CO2 SERPL-SCNC: 21 MMOL/L
CREAT SERPL-MCNC: 0.84 MG/DL
EGFRCR SERPLBLD CKD-EPI 2021: 78 ML/MIN/1.73M2
EOSINOPHIL # BLD AUTO: 0.07 K/UL
EOSINOPHIL NFR BLD AUTO: 0.8 %
ESTIMATED AVERAGE GLUCOSE: 108 MG/DL
GLUCOSE SERPL-MCNC: 89 MG/DL
HBA1C MFR BLD HPLC: 5.4 %
HCT VFR BLD CALC: 44.1 %
HDLC SERPL-MCNC: 48 MG/DL
HGB BLD-MCNC: 14.3 G/DL
IMM GRANULOCYTES NFR BLD AUTO: 0.2 %
LDLC SERPL-MCNC: 87 MG/DL
LYMPHOCYTES # BLD AUTO: 1.75 K/UL
LYMPHOCYTES NFR BLD AUTO: 19.2 %
MAN DIFF?: NORMAL
MCHC RBC-ENTMCNC: 32.2 PG
MCHC RBC-ENTMCNC: 32.4 G/DL
MCV RBC AUTO: 99.3 FL
MONOCYTES # BLD AUTO: 0.56 K/UL
MONOCYTES NFR BLD AUTO: 6.2 %
NEUTROPHILS # BLD AUTO: 6.68 K/UL
NEUTROPHILS NFR BLD AUTO: 73.4 %
NONHDLC SERPL-MCNC: 101 MG/DL
PLATELET # BLD AUTO: 316 K/UL
POTASSIUM SERPL-SCNC: 4.7 MMOL/L
PROT SERPL-MCNC: 6.5 G/DL
RBC # BLD: 4.44 M/UL
RBC # FLD: 13.8 %
SODIUM SERPL-SCNC: 142 MMOL/L
TRIGL SERPL-MCNC: 70 MG/DL
TSH SERPL-ACNC: 0.9 UIU/ML
WBC # FLD AUTO: 9.1 K/UL

## 2025-09-02 ENCOUNTER — TRANSCRIPTION ENCOUNTER (OUTPATIENT)
Age: 65
End: 2025-09-02

## 2025-09-05 ENCOUNTER — NON-APPOINTMENT (OUTPATIENT)
Age: 65
End: 2025-09-05

## 2025-09-05 VITALS — HEIGHT: 67 IN | WEIGHT: 275 LBS | BODY MASS INDEX: 43.16 KG/M2

## 2025-09-05 DIAGNOSIS — Z87.891 PERSONAL HISTORY OF NICOTINE DEPENDENCE: ICD-10-CM

## 2025-09-15 ENCOUNTER — APPOINTMENT (OUTPATIENT)
Dept: MAMMOGRAPHY | Facility: CLINIC | Age: 65
End: 2025-09-15

## 2025-09-15 ENCOUNTER — RESULT REVIEW (OUTPATIENT)
Age: 65
End: 2025-09-15

## 2025-09-15 ENCOUNTER — APPOINTMENT (OUTPATIENT)
Dept: CT IMAGING | Facility: CLINIC | Age: 65
End: 2025-09-15